# Patient Record
Sex: MALE | Race: WHITE | Employment: OTHER | URBAN - METROPOLITAN AREA
[De-identification: names, ages, dates, MRNs, and addresses within clinical notes are randomized per-mention and may not be internally consistent; named-entity substitution may affect disease eponyms.]

---

## 2022-07-08 ENCOUNTER — OFFICE VISIT (OUTPATIENT)
Dept: SURGERY | Age: 67
End: 2022-07-08
Payer: MEDICARE

## 2022-07-08 VITALS — WEIGHT: 205 LBS | SYSTOLIC BLOOD PRESSURE: 110 MMHG | DIASTOLIC BLOOD PRESSURE: 70 MMHG

## 2022-07-08 DIAGNOSIS — N32.1 COLOVESICAL FISTULA: Primary | ICD-10-CM

## 2022-07-08 PROCEDURE — 99204 OFFICE O/P NEW MOD 45 MIN: CPT | Performed by: SURGERY

## 2022-07-08 ASSESSMENT — ENCOUNTER SYMPTOMS
EYES NEGATIVE: 1
GASTROINTESTINAL NEGATIVE: 1
RESPIRATORY NEGATIVE: 1
ALLERGIC/IMMUNOLOGIC NEGATIVE: 1

## 2022-07-08 NOTE — LETTER
[address]  [address] [address]  Phone: [phone]  Fax: [phone]    July 11, 2022    Patient: Jian Bee  MRN:  [id number]  YOB: 1955  Date of Visit: 7/8/2022    Dear Levy Goltz,    Thank you for the request for consultation for Abril Dhillon. Below are the relevant portions of my assessment and plan of care. Assessment:  Pleasant 73-year-old male who was treated for sigmoid diverticulitis in October 2020. After that episode resolved, the patient had a colonoscopy. He presents now with a 6-week history of pneumaturia. He is currently on antibiotics for urinary tract infection. No complaints of abdominal pain. CAT scan dated 6/30/2022 shows sigmoid diverticulitis with a contained perforation (3.1 cm) and development of a colovesical fistula with subsequent cystitis. The patient's clinical and imaging findings are both consistent with a colovesical fistula from diverticular disease. Plan:  Robotic repair of colovesical fistula with sigmoid colon resection and coloproctostomy. We will coordinate surgery with urology. The patient was explained the risks, benefits and possible complications including risk of bleeding, bowel injury or anastomotic leakage. If you have questions, please do not hesitate to call me. I look forward to following Armida Hart along with you.     Sincerely,    Wojciech Lizarraga MD     providers:    CLEMENTINA Wilson - [facility] [phone]  Via Fax: [phone]

## 2022-07-08 NOTE — PROGRESS NOTES
Barbie Villatoro is a 74 y.o. male     CC: Pneumaturia    HPI: Pleasant 38-year-old male who was treated for sigmoid diverticulitis in 2020. After that episode resolved, the patient had a colonoscopy. He presents now with a 6-week history of pneumaturia. He is currently on antibiotics for urinary tract infection. No complaints of abdominal pain. No nausea, vomiting, anorexia, unexpected weight loss, fevers, chills, change in bowel habits or urinary symptoms. CAT scan dated 2022 shows sigmoid diverticulitis with a contained perforation (3.1 cm) and development of a colovesical fistula with subsequent cystitis. No past medical history on file. Patient has no known allergies. No past surgical history on file. Prior to Visit Medications    Not on File       Social History     Socioeconomic History    Marital status: Unknown     Spouse name: Not on file    Number of children: Not on file    Years of education: Not on file    Highest education level: Not on file   Occupational History    Not on file   Tobacco Use    Smoking status: Former Smoker     Quit date: 2022     Years since quittin.1    Smokeless tobacco: Never Used   Substance and Sexual Activity    Alcohol use: Yes     Comment: occasionally - Quit 2022    Drug use: Never    Sexual activity: Not on file   Other Topics Concern    Not on file   Social History Narrative    Not on file     Social Determinants of Health     Financial Resource Strain:     Difficulty of Paying Living Expenses: Not on file   Food Insecurity:     Worried About [address] in the Last Year: Not on file    [address] in the Last Year: Not on file   Transportation Needs:     Lack of Transportation (Medical): Not on file    Lack of Transportation (Non-Medical):  Not on file   Physical Activity:     Days of Exercise per Week: Not on file    Minutes of Exercise per Session: Not on file   Stress:     Feeling of Stress : Not on file   Social Connections:     Frequency of Communication with Friends and Family: Not on file    Frequency of Social Gatherings with Friends and Family: Not on file    Attends Protestant Services: Not on file    Active Member of Clubs or Organizations: Not on file    Attends Club or Organization Meetings: Not on file    Marital Status: Not on file   Intimate Partner Violence:     Fear of Current or Ex-Partner: Not on file    Emotionally Abused: Not on file    Physically Abused: Not on file    Sexually Abused: Not on file   Housing Stability:     Unable to Pay for Housing in the Last Year: Not on file    Number of [place] in the Last Year: Not on file    Unstable Housing in the Last Year: Not on file       Review of Systems   Constitutional: Negative. HENT: Negative. Eyes: Negative. Respiratory: Negative. Cardiovascular: Negative. Gastrointestinal: Negative. Endocrine: Negative. Genitourinary: Positive for decreased urine volume, difficulty urinating, frequency, hematuria, scrotal swelling and testicular pain. Musculoskeletal: Negative. Skin: Negative. Allergic/Immunologic: Negative. Neurological: Negative. Hematological: Negative. Psychiatric/Behavioral: Negative.        :   Physical Exam  Constitutional:       Appearance: He is well-developed. HENT:      Head: Normocephalic and atraumatic. Right Ear: External ear normal.      Left Ear: External ear normal.   Eyes:      Conjunctiva/sclera: Conjunctivae normal.   Cardiovascular:      Rate and Rhythm: Normal rate and regular rhythm. Pulmonary:      Effort: Pulmonary effort is normal.      Breath sounds: Normal breath sounds. Abdominal:      General: There is no distension. Palpations: Abdomen is soft. Tenderness: There is no abdominal tenderness. Musculoskeletal:         General: Normal range of motion. Cervical back: Normal range of motion and neck supple.    Skin:

## 2022-07-27 NOTE — PROGRESS NOTES
Name_______________________________________Printed:____________________  Date and time of surgery__8/16 1230______________________Arrival Time:__1100______________   1. The instructions given regarding when and if a patient needs to stop oral intake prior to surgery varies. Follow the specific instructions you were given                  __x_Nothing to eat or to drink after Midnight the night before.                   ____Carbo loading or ERAS instructions will be given to select patients-if you have been given those instructions -please do the following                           The evening before your surgery after dinner before midnight drink 40 ounces of gatorade. If you are diabetic use sugar free. The morning of surgery drink 40 ounces of water. This needs to be finished 3 hours prior to your surgery start time. 2. Take the following pills with a small sip of water on the morning of surgery___________________________________________________                  Do not take blood pressure medications ending in pril or sartan the mansoor prior to surgery or the morning of surgery_   3. Aspirin, Ibuprofen, Advil, Naproxen, Vitamin E and other Anti-inflammatory products and supplements should be stopped for 5 -7days before surgery or as directed by your physician. 4. Check with your Doctor regarding stopping Plavix, Coumadin,Eliquis, Lovenox,Effient,Pradaxa,Xarelto, Fragmin or other blood thinners and follow their instructions. 5. Do not smoke, and do not drink any alcoholic beverages 24 hours prior to surgery. This includes NA Beer. Refrain from the usage of any recreational drugs. 6. You may brush your teeth and gargle the morning of surgery. DO NOT SWALLOW WATER   7. You MUST make arrangements for a responsible adult to stay on site while you are here and take you home after your surgery. You will not be allowed to leave alone or drive yourself home.   It is strongly suggested someone stay with you the first 24 hrs. Your surgery will be cancelled if you do not have a ride home. 8. A parent/legal guardian must accompany a child scheduled for surgery and plan to stay at the hospital until the child is discharged. Please do not bring other children with you. 9. Please wear simple, loose fitting clothing to the hospital.  Kristine Jones not bring valuables (money, credit cards, checkbooks, etc.) Do not wear any makeup (including no eye makeup) or nail polish on your fingers or toes. 10. DO NOT wear any jewelry or piercings on day of surgery. All body piercing jewelry must be removed. 11. If you have ___dentures, they will be removed before going to the OR; we will provide you a container. If you wear ___contact lenses or ___glasses, they will be removed; please bring a case for them. 12. Please see your family doctor/pediatrician for a history & physical and/or concerning medications. Bring any test results/reports from your physician's office. PCP__________________Phone___________H&P Appt. Date________             13 If you  have a Living Will and Durable Power of  for Healthcare, please bring in a copy. 15. Notify your Surgeon if you develop any illness between now and surgery  time, cough, cold, fever, sore throat, nausea, vomiting, etc.  Please notify your surgeon if you experience dizziness, shortness of breath or blurred vision between now & the time of your surgery             15. DO NOT shave your operative site 96 hours prior to surgery. For face & neck surgery, men may use an electric razor 48 hours prior to surgery. 16. Shower the night before or morning of surgery using an antibacterial soap or as you have been instructed. 17. To provide excellent care visitors will be limited to one in the room at any given time. 18.  Please bring picture ID and insurance card.              19.  Visit our web site for additional information: [email or web address]              20.During flu season no children under the age of 15 are permitted in the hospital for the safety of all patients. 21. If you take a long acting insulin in the evening only  take half of your usual  dose the night  before your procedure              22. If you use a c-pap please bring DOS if staying overnight,             23.For your convenience [id number] [place] [address] has a pharmacy on site to fill your prescriptions. 24. If you use oxygen and have a portable tank please bring it  with you the DOS             25. Bring a complete list of all your medications with name and dose include any supplements. 26. Other__________________________________________   *Please call pre admission testing if you any further questions   [address] [place] [address]. [facility]  [phone]   [address] [place] [address]       VISITOR POLICY(subject to change)    Current policy is 2 visitors per patient. No children. A mask is required. Visiting hours are 8a-8p. Overnight visitors will be at the discretion of the nurse. All above information reviewed with patient in person or by phone. Patient verbalizes understanding. All questions and concerns addressed.                                                                                                  Patient/Rep____________________                                                                                                                           Per phone/pt         PRE OP INSTRUCTIONS

## 2022-08-16 ENCOUNTER — ANESTHESIA (OUTPATIENT)
Dept: OPERATING ROOM | Age: 67
DRG: 330 | End: 2022-08-16
Payer: MEDICARE

## 2022-08-16 ENCOUNTER — HOSPITAL ENCOUNTER (INPATIENT)
Age: 67
LOS: 9 days | Discharge: HOME | DRG: 330 | End: 2022-08-25
Attending: SURGERY | Admitting: SURGERY
Payer: MEDICARE

## 2022-08-16 ENCOUNTER — ANESTHESIA EVENT (OUTPATIENT)
Dept: OPERATING ROOM | Age: 67
DRG: 330 | End: 2022-08-16
Payer: MEDICARE

## 2022-08-16 DIAGNOSIS — N32.1 COLOVESICAL FISTULA: Primary | ICD-10-CM

## 2022-08-16 LAB
ANION GAP SERPL CALCULATED.3IONS-SCNC: 12 MMOL/L (ref 3–16)
BUN BLDV-MCNC: 9 MG/DL (ref 7–20)
CALCIUM SERPL-MCNC: 9.9 MG/DL (ref 8.3–10.6)
CHLORIDE BLD-SCNC: 102 MMOL/L (ref 99–110)
CO2: 26 MMOL/L (ref 21–32)
CREAT SERPL-MCNC: 0.9 MG/DL (ref 0.8–1.3)
GFR AFRICAN AMERICAN: >60
GFR NON-AFRICAN AMERICAN: >60
GLUCOSE BLD-MCNC: 130 MG/DL (ref 70–99)
HCT VFR BLD CALC: 46.1 % (ref 40.5–52.5)
HEMOGLOBIN: 15.3 G/DL (ref 13.5–17.5)
MCH RBC QN AUTO: 28.5 PG (ref 26–34)
MCHC RBC AUTO-ENTMCNC: 33.2 G/DL (ref 31–36)
MCV RBC AUTO: 86 FL (ref 80–100)
PDW BLD-RTO: 13.7 % (ref 12.4–15.4)
PLATELET # BLD: 335 K/UL (ref 135–450)
PMV BLD AUTO: 6.6 FL (ref 5–10.5)
POTASSIUM SERPL-SCNC: 4.3 MMOL/L (ref 3.5–5.1)
RBC # BLD: 5.36 M/UL (ref 4.2–5.9)
SODIUM BLD-SCNC: 140 MMOL/L (ref 136–145)
WBC # BLD: 10.6 K/UL (ref 4–11)

## 2022-08-16 PROCEDURE — 36415 COLL VENOUS BLD VENIPUNCTURE: CPT

## 2022-08-16 PROCEDURE — 2720000010 HC SURG SUPPLY STERILE: Performed by: SURGERY

## 2022-08-16 PROCEDURE — 88304 TISSUE EXAM BY PATHOLOGIST: CPT

## 2022-08-16 PROCEDURE — 44207 L COLECTOMY/COLOPROCTOSTOMY: CPT | Performed by: SURGERY

## 2022-08-16 PROCEDURE — 2580000003 HC RX 258: Performed by: SURGERY

## 2022-08-16 PROCEDURE — 6360000002 HC RX W HCPCS: Performed by: ANESTHESIOLOGY

## 2022-08-16 PROCEDURE — 88307 TISSUE EXAM BY PATHOLOGIST: CPT

## 2022-08-16 PROCEDURE — 2580000003 HC RX 258: Performed by: NURSE ANESTHETIST, CERTIFIED REGISTERED

## 2022-08-16 PROCEDURE — 7100000000 HC PACU RECOVERY - FIRST 15 MIN: Performed by: SURGERY

## 2022-08-16 PROCEDURE — 6370000000 HC RX 637 (ALT 250 FOR IP): Performed by: NURSE ANESTHETIST, CERTIFIED REGISTERED

## 2022-08-16 PROCEDURE — 94760 N-INVAS EAR/PLS OXIMETRY 1: CPT

## 2022-08-16 PROCEDURE — 88302 TISSUE EXAM BY PATHOLOGIST: CPT

## 2022-08-16 PROCEDURE — 85027 COMPLETE CBC AUTOMATED: CPT

## 2022-08-16 PROCEDURE — 6360000002 HC RX W HCPCS

## 2022-08-16 PROCEDURE — 2500000003 HC RX 250 WO HCPCS: Performed by: NURSE ANESTHETIST, CERTIFIED REGISTERED

## 2022-08-16 PROCEDURE — 6360000002 HC RX W HCPCS: Performed by: NURSE ANESTHETIST, CERTIFIED REGISTERED

## 2022-08-16 PROCEDURE — 2500000003 HC RX 250 WO HCPCS: Performed by: SURGERY

## 2022-08-16 PROCEDURE — 2709999900 HC NON-CHARGEABLE SUPPLY: Performed by: SURGERY

## 2022-08-16 PROCEDURE — 3600000009 HC SURGERY ROBOT BASE: Performed by: SURGERY

## 2022-08-16 PROCEDURE — 1200000000 HC SEMI PRIVATE

## 2022-08-16 PROCEDURE — A4217 STERILE WATER/SALINE, 500 ML: HCPCS | Performed by: SURGERY

## 2022-08-16 PROCEDURE — 2700000000 HC OXYGEN THERAPY PER DAY

## 2022-08-16 PROCEDURE — 3700000001 HC ADD 15 MINUTES (ANESTHESIA): Performed by: SURGERY

## 2022-08-16 PROCEDURE — P9045 ALBUMIN (HUMAN), 5%, 250 ML: HCPCS | Performed by: NURSE ANESTHETIST, CERTIFIED REGISTERED

## 2022-08-16 PROCEDURE — 2580000003 HC RX 258: Performed by: ANESTHESIOLOGY

## 2022-08-16 PROCEDURE — 3600000019 HC SURGERY ROBOT ADDTL 15MIN: Performed by: SURGERY

## 2022-08-16 PROCEDURE — S2900 ROBOTIC SURGICAL SYSTEM: HCPCS | Performed by: SURGERY

## 2022-08-16 PROCEDURE — 3700000000 HC ANESTHESIA ATTENDED CARE: Performed by: SURGERY

## 2022-08-16 PROCEDURE — 7100000001 HC PACU RECOVERY - ADDTL 15 MIN: Performed by: SURGERY

## 2022-08-16 PROCEDURE — 94150 VITAL CAPACITY TEST: CPT

## 2022-08-16 PROCEDURE — 80048 BASIC METABOLIC PNL TOTAL CA: CPT

## 2022-08-16 RX ORDER — SUCCINYLCHOLINE/SOD CL,ISO/PF 200MG/10ML
SYRINGE (ML) INTRAVENOUS PRN
Status: DISCONTINUED | OUTPATIENT
Start: 2022-08-16 | End: 2022-08-16 | Stop reason: SDUPTHER

## 2022-08-16 RX ORDER — HYDROMORPHONE HYDROCHLORIDE 1 MG/ML
1 INJECTION, SOLUTION INTRAMUSCULAR; INTRAVENOUS; SUBCUTANEOUS
Status: DISCONTINUED | OUTPATIENT
Start: 2022-08-16 | End: 2022-08-22

## 2022-08-16 RX ORDER — CARBOXYMETHYLCELLULOSE SODIUM 10 MG/ML
GEL OPHTHALMIC PRN
Status: DISCONTINUED | OUTPATIENT
Start: 2022-08-16 | End: 2022-08-16 | Stop reason: SDUPTHER

## 2022-08-16 RX ORDER — ONDANSETRON 2 MG/ML
4 INJECTION INTRAMUSCULAR; INTRAVENOUS EVERY 6 HOURS PRN
Status: DISCONTINUED | OUTPATIENT
Start: 2022-08-16 | End: 2022-08-25 | Stop reason: HOSPADM

## 2022-08-16 RX ORDER — MEPERIDINE HYDROCHLORIDE 25 MG/ML
12.5 INJECTION INTRAMUSCULAR; INTRAVENOUS; SUBCUTANEOUS EVERY 5 MIN PRN
Status: DISCONTINUED | OUTPATIENT
Start: 2022-08-16 | End: 2022-08-16 | Stop reason: HOSPADM

## 2022-08-16 RX ORDER — KETAMINE HCL IN NACL, ISO-OSM 100MG/10ML
SYRINGE (ML) INJECTION PRN
Status: DISCONTINUED | OUTPATIENT
Start: 2022-08-16 | End: 2022-08-16 | Stop reason: SDUPTHER

## 2022-08-16 RX ORDER — BUPIVACAINE HYDROCHLORIDE AND EPINEPHRINE 5; 5 MG/ML; UG/ML
INJECTION, SOLUTION EPIDURAL; INTRACAUDAL; PERINEURAL
Status: COMPLETED | OUTPATIENT
Start: 2022-08-16 | End: 2022-08-16

## 2022-08-16 RX ORDER — METRONIDAZOLE 500 MG/100ML
INJECTION, SOLUTION INTRAVENOUS
Status: DISCONTINUED
Start: 2022-08-16 | End: 2022-08-16

## 2022-08-16 RX ORDER — GLYCOPYRROLATE 0.2 MG/ML
INJECTION INTRAMUSCULAR; INTRAVENOUS PRN
Status: DISCONTINUED | OUTPATIENT
Start: 2022-08-16 | End: 2022-08-16 | Stop reason: SDUPTHER

## 2022-08-16 RX ORDER — HYDROMORPHONE HCL 110MG/55ML
PATIENT CONTROLLED ANALGESIA SYRINGE INTRAVENOUS PRN
Status: DISCONTINUED | OUTPATIENT
Start: 2022-08-16 | End: 2022-08-16 | Stop reason: SDUPTHER

## 2022-08-16 RX ORDER — SODIUM CHLORIDE, SODIUM LACTATE, POTASSIUM CHLORIDE, CALCIUM CHLORIDE 600; 310; 30; 20 MG/100ML; MG/100ML; MG/100ML; MG/100ML
INJECTION, SOLUTION INTRAVENOUS CONTINUOUS
Status: DISCONTINUED | OUTPATIENT
Start: 2022-08-16 | End: 2022-08-16 | Stop reason: HOSPADM

## 2022-08-16 RX ORDER — LIDOCAINE HYDROCHLORIDE 20 MG/ML
INJECTION, SOLUTION EPIDURAL; INFILTRATION; INTRACAUDAL; PERINEURAL PRN
Status: DISCONTINUED | OUTPATIENT
Start: 2022-08-16 | End: 2022-08-16 | Stop reason: SDUPTHER

## 2022-08-16 RX ORDER — METRONIDAZOLE 500 MG/100ML
500 INJECTION, SOLUTION INTRAVENOUS ONCE
Status: DISCONTINUED | OUTPATIENT
Start: 2022-08-16 | End: 2022-08-16

## 2022-08-16 RX ORDER — SODIUM CHLORIDE 0.9 % (FLUSH) 0.9 %
5-40 SYRINGE (ML) INJECTION PRN
Status: DISCONTINUED | OUTPATIENT
Start: 2022-08-16 | End: 2022-08-25 | Stop reason: HOSPADM

## 2022-08-16 RX ORDER — DEXAMETHASONE SODIUM PHOSPHATE 4 MG/ML
INJECTION, SOLUTION INTRA-ARTICULAR; INTRALESIONAL; INTRAMUSCULAR; INTRAVENOUS; SOFT TISSUE PRN
Status: DISCONTINUED | OUTPATIENT
Start: 2022-08-16 | End: 2022-08-16 | Stop reason: SDUPTHER

## 2022-08-16 RX ORDER — SODIUM CHLORIDE 0.9 % (FLUSH) 0.9 %
5-40 SYRINGE (ML) INJECTION PRN
Status: DISCONTINUED | OUTPATIENT
Start: 2022-08-16 | End: 2022-08-16 | Stop reason: HOSPADM

## 2022-08-16 RX ORDER — SODIUM CHLORIDE 9 MG/ML
INJECTION, SOLUTION INTRAVENOUS CONTINUOUS PRN
Status: DISCONTINUED | OUTPATIENT
Start: 2022-08-16 | End: 2022-08-16 | Stop reason: SDUPTHER

## 2022-08-16 RX ORDER — CEFAZOLIN 2 G/1
INJECTION, POWDER, FOR SOLUTION INTRAMUSCULAR; INTRAVENOUS
Status: COMPLETED
Start: 2022-08-16 | End: 2022-08-16

## 2022-08-16 RX ORDER — MAGNESIUM SULFATE HEPTAHYDRATE 500 MG/ML
INJECTION, SOLUTION INTRAMUSCULAR; INTRAVENOUS PRN
Status: DISCONTINUED | OUTPATIENT
Start: 2022-08-16 | End: 2022-08-16 | Stop reason: SDUPTHER

## 2022-08-16 RX ORDER — TAMSULOSIN HYDROCHLORIDE 0.4 MG/1
0.4 CAPSULE ORAL DAILY
Status: DISCONTINUED | OUTPATIENT
Start: 2022-08-16 | End: 2022-08-25 | Stop reason: HOSPADM

## 2022-08-16 RX ORDER — DEXTROSE, SODIUM CHLORIDE, AND POTASSIUM CHLORIDE 5; .45; .15 G/100ML; G/100ML; G/100ML
INJECTION INTRAVENOUS CONTINUOUS
Status: DISCONTINUED | OUTPATIENT
Start: 2022-08-16 | End: 2022-08-25 | Stop reason: HOSPADM

## 2022-08-16 RX ORDER — PROPOFOL 10 MG/ML
INJECTION, EMULSION INTRAVENOUS PRN
Status: DISCONTINUED | OUTPATIENT
Start: 2022-08-16 | End: 2022-08-16 | Stop reason: SDUPTHER

## 2022-08-16 RX ORDER — VECURONIUM BROMIDE 1 MG/ML
INJECTION, POWDER, LYOPHILIZED, FOR SOLUTION INTRAVENOUS PRN
Status: DISCONTINUED | OUTPATIENT
Start: 2022-08-16 | End: 2022-08-16 | Stop reason: SDUPTHER

## 2022-08-16 RX ORDER — SODIUM CHLORIDE, SODIUM LACTATE, POTASSIUM CHLORIDE, CALCIUM CHLORIDE 600; 310; 30; 20 MG/100ML; MG/100ML; MG/100ML; MG/100ML
INJECTION, SOLUTION INTRAVENOUS CONTINUOUS PRN
Status: DISCONTINUED | OUTPATIENT
Start: 2022-08-16 | End: 2022-08-16 | Stop reason: SDUPTHER

## 2022-08-16 RX ORDER — FENTANYL CITRATE 50 UG/ML
INJECTION, SOLUTION INTRAMUSCULAR; INTRAVENOUS PRN
Status: DISCONTINUED | OUTPATIENT
Start: 2022-08-16 | End: 2022-08-16 | Stop reason: SDUPTHER

## 2022-08-16 RX ORDER — ALBUMIN, HUMAN INJ 5% 5 %
SOLUTION INTRAVENOUS PRN
Status: DISCONTINUED | OUTPATIENT
Start: 2022-08-16 | End: 2022-08-16 | Stop reason: SDUPTHER

## 2022-08-16 RX ORDER — MAGNESIUM HYDROXIDE 1200 MG/15ML
LIQUID ORAL CONTINUOUS PRN
Status: COMPLETED | OUTPATIENT
Start: 2022-08-16 | End: 2022-08-16

## 2022-08-16 RX ORDER — EPHEDRINE SULFATE/0.9% NACL/PF 50 MG/5 ML
SYRINGE (ML) INTRAVENOUS PRN
Status: DISCONTINUED | OUTPATIENT
Start: 2022-08-16 | End: 2022-08-16 | Stop reason: SDUPTHER

## 2022-08-16 RX ORDER — SODIUM CHLORIDE 9 MG/ML
INJECTION, SOLUTION INTRAVENOUS PRN
Status: DISCONTINUED | OUTPATIENT
Start: 2022-08-16 | End: 2022-08-25 | Stop reason: HOSPADM

## 2022-08-16 RX ORDER — SODIUM CHLORIDE 9 MG/ML
INJECTION, SOLUTION INTRAVENOUS PRN
Status: DISCONTINUED | OUTPATIENT
Start: 2022-08-16 | End: 2022-08-16 | Stop reason: HOSPADM

## 2022-08-16 RX ORDER — HYDROMORPHONE HCL 110MG/55ML
0.5 PATIENT CONTROLLED ANALGESIA SYRINGE INTRAVENOUS EVERY 5 MIN PRN
Status: DISCONTINUED | OUTPATIENT
Start: 2022-08-16 | End: 2022-08-16 | Stop reason: HOSPADM

## 2022-08-16 RX ORDER — MIDAZOLAM HYDROCHLORIDE 1 MG/ML
INJECTION INTRAMUSCULAR; INTRAVENOUS PRN
Status: DISCONTINUED | OUTPATIENT
Start: 2022-08-16 | End: 2022-08-16 | Stop reason: SDUPTHER

## 2022-08-16 RX ORDER — SODIUM CHLORIDE 0.9 % (FLUSH) 0.9 %
5-40 SYRINGE (ML) INJECTION EVERY 12 HOURS SCHEDULED
Status: DISCONTINUED | OUTPATIENT
Start: 2022-08-16 | End: 2022-08-16 | Stop reason: HOSPADM

## 2022-08-16 RX ORDER — ONDANSETRON 2 MG/ML
4 INJECTION INTRAMUSCULAR; INTRAVENOUS
Status: COMPLETED | OUTPATIENT
Start: 2022-08-16 | End: 2022-08-16

## 2022-08-16 RX ORDER — SODIUM CHLORIDE 0.9 % (FLUSH) 0.9 %
5-40 SYRINGE (ML) INJECTION EVERY 12 HOURS SCHEDULED
Status: DISCONTINUED | OUTPATIENT
Start: 2022-08-16 | End: 2022-08-25 | Stop reason: HOSPADM

## 2022-08-16 RX ORDER — ENOXAPARIN SODIUM 100 MG/ML
40 INJECTION SUBCUTANEOUS DAILY
Status: DISCONTINUED | OUTPATIENT
Start: 2022-08-17 | End: 2022-08-25 | Stop reason: HOSPADM

## 2022-08-16 RX ORDER — SODIUM CHLORIDE, SODIUM LACTATE, POTASSIUM CHLORIDE, CALCIUM CHLORIDE 600; 310; 30; 20 MG/100ML; MG/100ML; MG/100ML; MG/100ML
INJECTION, SOLUTION INTRAVENOUS CONTINUOUS PRN
Status: COMPLETED | OUTPATIENT
Start: 2022-08-16 | End: 2022-08-16

## 2022-08-16 RX ORDER — ONDANSETRON 2 MG/ML
INJECTION INTRAMUSCULAR; INTRAVENOUS PRN
Status: DISCONTINUED | OUTPATIENT
Start: 2022-08-16 | End: 2022-08-16 | Stop reason: SDUPTHER

## 2022-08-16 RX ADMIN — VECURONIUM BROMIDE 3 MG: 1 INJECTION, POWDER, LYOPHILIZED, FOR SOLUTION INTRAVENOUS at 13:36

## 2022-08-16 RX ADMIN — HYDROMORPHONE HYDROCHLORIDE 0.5 MG: 2 INJECTION, SOLUTION INTRAMUSCULAR; INTRAVENOUS; SUBCUTANEOUS at 16:15

## 2022-08-16 RX ADMIN — SODIUM CHLORIDE, POTASSIUM CHLORIDE, SODIUM LACTATE AND CALCIUM CHLORIDE: 600; 310; 30; 20 INJECTION, SOLUTION INTRAVENOUS at 12:26

## 2022-08-16 RX ADMIN — POTASSIUM CHLORIDE, DEXTROSE MONOHYDRATE AND SODIUM CHLORIDE: 150; 5; 450 INJECTION, SOLUTION INTRAVENOUS at 17:58

## 2022-08-16 RX ADMIN — PHENYLEPHRINE HYDROCHLORIDE 100 MCG: 10 INJECTION INTRAVENOUS at 14:43

## 2022-08-16 RX ADMIN — SUGAMMADEX 200 MG: 100 INJECTION, SOLUTION INTRAVENOUS at 16:10

## 2022-08-16 RX ADMIN — SODIUM CHLORIDE, POTASSIUM CHLORIDE, SODIUM LACTATE AND CALCIUM CHLORIDE: 600; 310; 30; 20 INJECTION, SOLUTION INTRAVENOUS at 15:07

## 2022-08-16 RX ADMIN — SODIUM CHLORIDE, POTASSIUM CHLORIDE, SODIUM LACTATE AND CALCIUM CHLORIDE: 600; 310; 30; 20 INJECTION, SOLUTION INTRAVENOUS at 11:47

## 2022-08-16 RX ADMIN — SODIUM CHLORIDE: 9 INJECTION, SOLUTION INTRAVENOUS at 12:36

## 2022-08-16 RX ADMIN — PHENYLEPHRINE HYDROCHLORIDE 200 MCG: 10 INJECTION INTRAVENOUS at 12:38

## 2022-08-16 RX ADMIN — CARBOXYMETHYLCELLULOSE SODIUM 2 DROP: 10 GEL OPHTHALMIC at 12:35

## 2022-08-16 RX ADMIN — DEXAMETHASONE SODIUM PHOSPHATE 10 MG: 4 INJECTION, SOLUTION INTRAMUSCULAR; INTRAVENOUS at 12:45

## 2022-08-16 RX ADMIN — HYDROMORPHONE HYDROCHLORIDE 0.5 MG: 2 INJECTION, SOLUTION INTRAMUSCULAR; INTRAVENOUS; SUBCUTANEOUS at 17:54

## 2022-08-16 RX ADMIN — PHENYLEPHRINE HYDROCHLORIDE 100 MCG: 10 INJECTION INTRAVENOUS at 12:47

## 2022-08-16 RX ADMIN — VECURONIUM BROMIDE 2 MG: 1 INJECTION, POWDER, LYOPHILIZED, FOR SOLUTION INTRAVENOUS at 14:19

## 2022-08-16 RX ADMIN — LIDOCAINE HYDROCHLORIDE 100 MG: 20 INJECTION, SOLUTION EPIDURAL; INFILTRATION; INTRACAUDAL; PERINEURAL at 12:33

## 2022-08-16 RX ADMIN — HYDROMORPHONE HYDROCHLORIDE 0.5 MG: 2 INJECTION, SOLUTION INTRAMUSCULAR; INTRAVENOUS; SUBCUTANEOUS at 18:02

## 2022-08-16 RX ADMIN — PHENYLEPHRINE HYDROCHLORIDE 200 MCG: 10 INJECTION INTRAVENOUS at 12:53

## 2022-08-16 RX ADMIN — ONDANSETRON 4 MG: 2 INJECTION INTRAMUSCULAR; INTRAVENOUS at 17:52

## 2022-08-16 RX ADMIN — VECURONIUM BROMIDE 1 MG: 1 INJECTION, POWDER, LYOPHILIZED, FOR SOLUTION INTRAVENOUS at 14:52

## 2022-08-16 RX ADMIN — ALBUMIN (HUMAN) 12.5 G: 12.5 INJECTION, SOLUTION INTRAVENOUS at 13:13

## 2022-08-16 RX ADMIN — MIDAZOLAM 2 MG: 1 INJECTION INTRAMUSCULAR; INTRAVENOUS at 12:26

## 2022-08-16 RX ADMIN — PHENYLEPHRINE HYDROCHLORIDE 200 MCG: 10 INJECTION INTRAVENOUS at 12:40

## 2022-08-16 RX ADMIN — PHENYLEPHRINE HYDROCHLORIDE 200 MCG: 10 INJECTION INTRAVENOUS at 14:45

## 2022-08-16 RX ADMIN — Medication 10 MG: at 12:57

## 2022-08-16 RX ADMIN — VECURONIUM BROMIDE 1 MG: 1 INJECTION, POWDER, LYOPHILIZED, FOR SOLUTION INTRAVENOUS at 15:44

## 2022-08-16 RX ADMIN — VECURONIUM BROMIDE 3 MG: 1 INJECTION, POWDER, LYOPHILIZED, FOR SOLUTION INTRAVENOUS at 15:10

## 2022-08-16 RX ADMIN — CEFAZOLIN 2000 MG: 2 INJECTION, POWDER, FOR SOLUTION INTRAMUSCULAR; INTRAVENOUS at 12:22

## 2022-08-16 RX ADMIN — FENTANYL CITRATE 50 MCG: 50 INJECTION, SOLUTION INTRAMUSCULAR; INTRAVENOUS at 16:07

## 2022-08-16 RX ADMIN — ONDANSETRON 4 MG: 2 INJECTION INTRAMUSCULAR; INTRAVENOUS at 12:45

## 2022-08-16 RX ADMIN — PROPOFOL 150 MG: 10 INJECTION, EMULSION INTRAVENOUS at 12:33

## 2022-08-16 RX ADMIN — Medication 30 MG: at 12:45

## 2022-08-16 RX ADMIN — SODIUM CHLORIDE, POTASSIUM CHLORIDE, SODIUM LACTATE AND CALCIUM CHLORIDE: 600; 310; 30; 20 INJECTION, SOLUTION INTRAVENOUS at 16:19

## 2022-08-16 RX ADMIN — Medication 10 MG: at 15:11

## 2022-08-16 RX ADMIN — Medication 10 MG: at 15:59

## 2022-08-16 RX ADMIN — VECURONIUM BROMIDE 1 MG: 1 INJECTION, POWDER, LYOPHILIZED, FOR SOLUTION INTRAVENOUS at 12:33

## 2022-08-16 RX ADMIN — Medication 160 MG: at 12:34

## 2022-08-16 RX ADMIN — VECURONIUM BROMIDE 9 MG: 1 INJECTION, POWDER, LYOPHILIZED, FOR SOLUTION INTRAVENOUS at 12:45

## 2022-08-16 RX ADMIN — MAGNESIUM SULFATE HEPTAHYDRATE 1 G: 500 INJECTION, SOLUTION INTRAMUSCULAR; INTRAVENOUS at 12:45

## 2022-08-16 RX ADMIN — Medication 20 MG: at 16:03

## 2022-08-16 RX ADMIN — GLYCOPYRROLATE 0.2 MG: 0.2 INJECTION, SOLUTION INTRAMUSCULAR; INTRAVENOUS at 12:45

## 2022-08-16 RX ADMIN — FENTANYL CITRATE 50 MCG: 50 INJECTION, SOLUTION INTRAMUSCULAR; INTRAVENOUS at 12:33

## 2022-08-16 ASSESSMENT — PAIN DESCRIPTION - LOCATION
LOCATION: ABDOMEN
LOCATION: ABDOMEN

## 2022-08-16 ASSESSMENT — PAIN SCALES - GENERAL
PAINLEVEL_OUTOF10: 0
PAINLEVEL_OUTOF10: 8
PAINLEVEL_OUTOF10: 0
PAINLEVEL_OUTOF10: 7

## 2022-08-16 NOTE — PROGRESS NOTES
Pt arrived from OR to PACU, oral airway in place. VSS, O2 sats 100% on 6 L simple mask. Incisions to abdomen dry and intact, abdomen rounded, soft. Loop ileostomy in place, stoma pink, moist. Estrada in place draining clear su urine. Will monitor.

## 2022-08-16 NOTE — H&P
Vianney Fowler     HPI: 74year old male with a colovesical fistula    History reviewed. No pertinent past medical history. History reviewed. No pertinent surgical history. Social History     Socioeconomic History    Marital status: Single     Spouse name: Not on file    Number of children: Not on file    Years of education: Not on file    Highest education level: Not on file   Occupational History    Not on file   Tobacco Use    Smoking status: Former     Types: Cigarettes     Quit date: 2022     Years since quittin.2    Smokeless tobacco: Never   Vaping Use    Vaping Use: Never used   Substance and Sexual Activity    Alcohol use: Yes     Comment: occasionally - Quit 2022    Drug use: Yes     Frequency: 2.0 times per week     Types: Marijuana Kang Arabella)    Sexual activity: Not on file   Other Topics Concern    Not on file   Social History Narrative    Not on file     Social Determinants of Health     Financial Resource Strain: Not on file   Food Insecurity: Not on file   Transportation Needs: Not on file   Physical Activity: Not on file   Stress: Not on file   Social Connections: Not on file   Intimate Partner Violence: Not on file   Housing Stability: Not on file       Allergies: No Known Allergies    Prior to Admission medications    Medication Sig Start Date End Date Taking? Authorizing Provider   Tamsulosin HCl (FLOMAX PO) Take by mouth   Yes Historical Provider, MD       Active Problems:    * No active hospital problems. *  Resolved Problems:    * No resolved hospital problems. *      Blood pressure 103/64, pulse 85, temperature 96.8 °F (36 °C), temperature source Temporal, resp. rate 18, height 6' (1.829 m), weight 200 lb 3.2 oz (90.8 kg), SpO2 95 %. Review of Systems    Physical Exam  Cardiovascular:      Rate and Rhythm: Normal rate and regular rhythm. Pulmonary:      Effort: Pulmonary effort is normal.      Breath sounds: Normal breath sounds.        Assessment:  Colovesical fistula    Plan:  Sigmoid colon resection with repair of colovesical fistula    Merrill Cat MD  8/16/2022

## 2022-08-16 NOTE — PROGRESS NOTES
Pt resting quietly in bed with eyes closed, awakens to voice. VSS, O2 sats 99% on 3 L NC. Incisions to abdomen dry and intact, abdomen soft, ice pack in place. Stoma pink, moist, small amount of bloody output. Estrada in place draining clear su urine. Pt seen by anesthesia,phase 1 criteria met. Will transfer pt to .

## 2022-08-16 NOTE — BRIEF OP NOTE
Brief Postoperative Note      Patient: Ramona Enrique  YOB: 1955  MRN: [id number]    Date of Procedure: 8/16/2022    Pre-Op Diagnosis: Colovesical fistula [N32.1]    Post-Op Diagnosis: Same       Procedure(s):  ROBOT ASSIST LAPAROSCOPIC SIGMOID COLON RESECTION WITH REPAIR OF COLOVESICAL FISTULA, APPENDECTOMY    Surgeon(s):  Dorie Crain MD    Assistant:  Surgical Assistant: Cayla Dsouza; Franco Rosales RN    Anesthesia: General    Estimated Blood Loss (mL): Minimal    Complications: None    Specimens:   ID Type Source Tests Collected by Time Destination   A : A) APPENDIX Tissue Tissue SURGICAL PATHOLOGY Dorie Crain MD 8/16/2022 1443    B : B) SIGMOID COLON Tissue Tissue SURGICAL PATHOLOGY Dorie Crain MD 8/16/2022 1501    C : C) ADDITIONAL PROXIMAL SEGMENTS AND ANASTOMOTIC RINGS Tissue Tissue SURGICAL PATHOLOGY Dorie Crain MD 8/16/2022 1504        Implants:  * No implants in log *      Drains:   Urinary Catheter Estrada (Active)       Findings: Severe inflammatory changes of the sigmoid colon which was densely adherent to the bladder. Colovesical fistula repaired. Sigmoid colon resection with coloproctostomy performed. Anastomosis diverted with a loop ileostomy.     Electronically signed by Dorie Crain MD on 8/16/2022 at 4:13 PM

## 2022-08-16 NOTE — ANESTHESIA PRE PROCEDURE
Department of Anesthesiology  Preprocedure Note       Name:  Janeann Felty   Age:  74 y.o.  :  1955                                          MRN:  [id number]         Date:  2022      Surgeon: Alyssa Lobato):  Rosie Vargas MD    Procedure: Procedure(s):  ROBOT ASSIST LAPAROSCOPIC SIGMOID COLON RESECTION WITH REPAIR OF COLOVESICAL FISTULA    Medications prior to admission:   Prior to Admission medications    Medication Sig Start Date End Date Taking? Authorizing Provider   Tamsulosin HCl (FLOMAX PO) Take by mouth   Yes Historical Provider, MD       Current medications:    No current facility-administered medications for this encounter. Allergies:  No Known Allergies    Problem List:  There is no problem list on file for this patient. Past Medical History:  History reviewed. No pertinent past medical history. Past Surgical History:  History reviewed. No pertinent surgical history.     Social History:    Social History     Tobacco Use    Smoking status: Former     Types: Cigarettes     Quit date: 2022     Years since quittin.2    Smokeless tobacco: Never   Substance Use Topics    Alcohol use: Yes     Comment: occasionally - Quit 2022                                Counseling given: Not Answered      Vital Signs (Current):   Vitals:    22 1017 22 1117   BP:  103/64   Pulse:  85   Resp:  18   Temp:  96.8 °F (36 °C)   TempSrc:  Temporal   SpO2:  95%   Weight: 200 lb (90.7 kg) 200 lb 3.2 oz (90.8 kg)   Height: 6' (1.829 m) 6' (1.829 m)                                              BP Readings from Last 3 Encounters:   22 103/64   22 110/70       NPO Status: Time of last liquid consumption: 2330                        Time of last solid consumption: 2300                        Date of last liquid consumption: 08/15/22                        Date of last solid food consumption: 22    BMI:   Wt Readings from Last 3 Encounters:   22 200 lb 3.2 oz (90.8 kg)   07/08/22 205 lb (93 kg)     Body mass index is 27.15 kg/m². CBC: No results found for: WBC, RBC, HGB, HCT, MCV, RDW, PLT    CMP: No results found for: NA, K, CL, CO2, BUN, CREATININE, GFRAA, AGRATIO, LABGLOM, GLUCOSE, GLU, PROT, CALCIUM, BILITOT, ALKPHOS, AST, ALT    POC Tests: No results for input(s): POCGLU, POCNA, POCK, POCCL, POCBUN, POCHEMO, POCHCT in the last 72 hours. Coags: No results found for: PROTIME, INR, APTT    HCG (If Applicable): No results found for: PREGTESTUR, PREGSERUM, HCG, HCGQUANT     ABGs: No results found for: PHART, PO2ART, SSZ5NZM, LQN8JWM, BEART, Y1LCQUSA     Type & Screen (If Applicable):  No results found for: LABABO, LABRH    Drug/Infectious Status (If Applicable):  No results found for: HIV, HEPCAB    COVID-19 Screening (If Applicable): No results found for: COVID19        Anesthesia Evaluation  Patient summary reviewed and Nursing notes reviewed no history of anesthetic complications:   Airway: Mallampati: II          Dental:          Pulmonary:                              Cardiovascular:                      Neuro/Psych:               GI/Hepatic/Renal:             Endo/Other:                     Abdominal:             Vascular:           Other Findings:           Anesthesia Plan      general     ASA 2                                   Gaby Dupree MD   8/16/2022

## 2022-08-17 LAB
BASOPHILS ABSOLUTE: 0 K/UL (ref 0–0.2)
BASOPHILS RELATIVE PERCENT: 0.3 %
EOSINOPHILS ABSOLUTE: 0 K/UL (ref 0–0.6)
EOSINOPHILS RELATIVE PERCENT: 0 %
HCT VFR BLD CALC: 36.8 % (ref 40.5–52.5)
HEMOGLOBIN: 12.4 G/DL (ref 13.5–17.5)
LYMPHOCYTES ABSOLUTE: 0.7 K/UL (ref 1–5.1)
LYMPHOCYTES RELATIVE PERCENT: 4.4 %
MCH RBC QN AUTO: 28.9 PG (ref 26–34)
MCHC RBC AUTO-ENTMCNC: 33.7 G/DL (ref 31–36)
MCV RBC AUTO: 85.7 FL (ref 80–100)
MONOCYTES ABSOLUTE: 0.6 K/UL (ref 0–1.3)
MONOCYTES RELATIVE PERCENT: 3.9 %
NEUTROPHILS ABSOLUTE: 15 K/UL (ref 1.7–7.7)
NEUTROPHILS RELATIVE PERCENT: 91.4 %
PDW BLD-RTO: 13.4 % (ref 12.4–15.4)
PLATELET # BLD: 302 K/UL (ref 135–450)
PMV BLD AUTO: 6.9 FL (ref 5–10.5)
RBC # BLD: 4.29 M/UL (ref 4.2–5.9)
WBC # BLD: 16.4 K/UL (ref 4–11)

## 2022-08-17 PROCEDURE — 2500000003 HC RX 250 WO HCPCS: Performed by: SURGERY

## 2022-08-17 PROCEDURE — APPNB30 APP NON BILLABLE TIME 0-30 MINS: Performed by: NURSE PRACTITIONER

## 2022-08-17 PROCEDURE — 85025 COMPLETE CBC W/AUTO DIFF WBC: CPT

## 2022-08-17 PROCEDURE — 36415 COLL VENOUS BLD VENIPUNCTURE: CPT

## 2022-08-17 PROCEDURE — 6370000000 HC RX 637 (ALT 250 FOR IP): Performed by: NURSE PRACTITIONER

## 2022-08-17 PROCEDURE — APPSS15 APP SPLIT SHARED TIME 0-15 MINUTES: Performed by: NURSE PRACTITIONER

## 2022-08-17 PROCEDURE — 6370000000 HC RX 637 (ALT 250 FOR IP): Performed by: SURGERY

## 2022-08-17 PROCEDURE — 6360000002 HC RX W HCPCS: Performed by: SURGERY

## 2022-08-17 PROCEDURE — 99024 POSTOP FOLLOW-UP VISIT: CPT | Performed by: SURGERY

## 2022-08-17 PROCEDURE — 2580000003 HC RX 258: Performed by: SURGERY

## 2022-08-17 PROCEDURE — 1200000000 HC SEMI PRIVATE

## 2022-08-17 RX ORDER — CALCIUM CARBONATE 200(500)MG
500 TABLET,CHEWABLE ORAL 3 TIMES DAILY PRN
Status: DISCONTINUED | OUTPATIENT
Start: 2022-08-17 | End: 2022-08-25 | Stop reason: HOSPADM

## 2022-08-17 RX ADMIN — HYDROMORPHONE HYDROCHLORIDE 1 MG: 1 INJECTION, SOLUTION INTRAMUSCULAR; INTRAVENOUS; SUBCUTANEOUS at 00:45

## 2022-08-17 RX ADMIN — POTASSIUM CHLORIDE, DEXTROSE MONOHYDRATE AND SODIUM CHLORIDE: 150; 5; 450 INJECTION, SOLUTION INTRAVENOUS at 02:48

## 2022-08-17 RX ADMIN — HYDROMORPHONE HYDROCHLORIDE 1 MG: 1 INJECTION, SOLUTION INTRAMUSCULAR; INTRAVENOUS; SUBCUTANEOUS at 06:00

## 2022-08-17 RX ADMIN — ANTACID TABLETS 500 MG: 500 TABLET, CHEWABLE ORAL at 19:39

## 2022-08-17 RX ADMIN — POTASSIUM CHLORIDE, DEXTROSE MONOHYDRATE AND SODIUM CHLORIDE: 150; 5; 450 INJECTION, SOLUTION INTRAVENOUS at 13:13

## 2022-08-17 RX ADMIN — HYDROMORPHONE HYDROCHLORIDE 1 MG: 1 INJECTION, SOLUTION INTRAMUSCULAR; INTRAVENOUS; SUBCUTANEOUS at 19:39

## 2022-08-17 RX ADMIN — ANTACID TABLETS 500 MG: 500 TABLET, CHEWABLE ORAL at 11:21

## 2022-08-17 RX ADMIN — TAMSULOSIN HYDROCHLORIDE 0.4 MG: 0.4 CAPSULE ORAL at 08:37

## 2022-08-17 RX ADMIN — HYDROMORPHONE HYDROCHLORIDE 1 MG: 1 INJECTION, SOLUTION INTRAMUSCULAR; INTRAVENOUS; SUBCUTANEOUS at 21:55

## 2022-08-17 RX ADMIN — Medication 10 ML: at 08:45

## 2022-08-17 RX ADMIN — ENOXAPARIN SODIUM 40 MG: 100 INJECTION SUBCUTANEOUS at 08:38

## 2022-08-17 RX ADMIN — HYDROMORPHONE HYDROCHLORIDE 1 MG: 1 INJECTION, SOLUTION INTRAMUSCULAR; INTRAVENOUS; SUBCUTANEOUS at 10:40

## 2022-08-17 RX ADMIN — POTASSIUM CHLORIDE, DEXTROSE MONOHYDRATE AND SODIUM CHLORIDE: 150; 5; 450 INJECTION, SOLUTION INTRAVENOUS at 21:57

## 2022-08-17 RX ADMIN — HYDROMORPHONE HYDROCHLORIDE 1 MG: 1 INJECTION, SOLUTION INTRAMUSCULAR; INTRAVENOUS; SUBCUTANEOUS at 15:28

## 2022-08-17 RX ADMIN — ANTACID TABLETS 500 MG: 500 TABLET, CHEWABLE ORAL at 15:28

## 2022-08-17 ASSESSMENT — PAIN SCALES - GENERAL
PAINLEVEL_OUTOF10: 7
PAINLEVEL_OUTOF10: 6
PAINLEVEL_OUTOF10: 7
PAINLEVEL_OUTOF10: 8
PAINLEVEL_OUTOF10: 9

## 2022-08-17 ASSESSMENT — PAIN DESCRIPTION - LOCATION
LOCATION: ABDOMEN
LOCATION: ABDOMEN

## 2022-08-17 NOTE — PROGRESS NOTES
[facility] and Laparoscopic Surgery        Progress Note    Patient Name: Gordo Gonzáles  MRN: [id number]  YOB: 1955  Date of Evaluation: 2022    Subjective:  No acute events overnight  Pain controlled  No nausea or vomiting, tolerated coffee this morning, but complains of dyspepsia early this morning, a little better at present, poor appetite  No enteric output from ostomy, scant bloody output  Resting in bed at this time    Post-Operative Day #1      Vital Signs:  Patient Vitals for the past 24 hrs:   BP Temp Temp src Pulse Resp SpO2   22 0830 107/62 98 °F (36.7 °C) Oral 73 15 91 %   22 0600 100/64 98.7 °F (37.1 °C) Oral 77 16 97 %   22 0045 -- -- -- -- 18 --   22 2300 124/70 98 °F (36.7 °C) Oral 70 18 98 %   22 -- -- -- 76 16 99 %   22 2030 129/73 98 °F (36.7 °C) Oral 74 16 99 %   22 1830 121/79 97.9 °F (36.6 °C) Oral 73 16 100 %   22 1805 118/63 -- -- 70 14 99 %   22 1800 117/67 -- -- 67 15 98 %   22 1745 115/63 -- -- 70 16 99 %   22 1730 (!) 119/59 -- -- 72 17 99 %   22 1715 (!) 114/58 -- -- 72 18 98 %   22 1700 (!) 102/57 -- -- 73 17 96 %   22 1645 94/63 -- -- 72 16 98 %   22 1635 (!) 114/52 97 °F (36.1 °C) Temporal 74 15 99 %   22 1630 (!) 107/54 -- -- 75 15 98 %   22 1625 (!) 109/56 -- -- 73 15 100 %   22 1620 (!) 105/54 -- -- 78 14 97 %   22 1618 (!) 105/54 96.8 °F (36 °C) Temporal 79 14 100 %      TEMPERATURE HISTORY 24H: Temp (24hrs), Av.8 °F (36.6 °C), Min:96.8 °F (36 °C), Max:98.7 °F (37.1 °C)    BLOOD PRESSURE HISTORY: Systolic (39NJA), UVT:077 , Min:94 , QKB:277    Diastolic (30IJN), FYZ:06, Min:52, Max:79      Intake/Output:  I/O last 3 completed shifts: In: 5871 [I.V.:3700; IV Piggyback:250]  Out: 1000 [Urine:900; Blood:100]  No intake/output data recorded.   Drain/tube Output:       Physical Exam:  General: awake, alert, oriented to person, place, time  Lungs: unlabored respirations  Abdomen: soft, distended, incisional tenderness only, bowel sounds present, stoma pink/viable--only small amount of bloody output in pouch  Skin/Wound: healing well, no drainage, no erythema, well approximated    Labs:  CBC:    Recent Labs     08/16/22  1138 08/17/22  0501   WBC 10.6 16.4*   HGB 15.3 12.4*   HCT 46.1 36.8*    302     BMP:    Recent Labs     08/16/22  1138      K 4.3      CO2 26   BUN 9   CREATININE 0.9   GLUCOSE 130*     Hepatic:  No results for input(s): AST, ALT, ALB, BILITOT, ALKPHOS in the last 72 hours. Amylase:  No results found for: AMYLASE  Lipase:  No results found for: LIPASE   Mag:  No results found for: MG  Phos:   No results found for: PHOS   Coags: No results found for: PROTIME, INR, APTT    Cultures:  Anaerobic culture  No results found for: LABANAE  Fungus stain  No results found for requested labs within last 30 days. Gram stain  No results found for requested labs within last 30 days. Organism  No results found for: [facility]  Surgical culture  No results found for: CXSURG  Blood culture 1  No results found for requested labs within last 30 days. Blood culture 2  No results found for requested labs within last 30 days. Fecal occult  No results found for requested labs within last 30 days. GI bacterial pathogens by PCR  No results found for requested labs within last 30 days. C. difficile  No results found for requested labs within last 30 days. Urine culture  No results found for: LABURIN    Pathology:  Pathology results pending     Imaging:  I have personally reviewed the following films:    No results found.     Scheduled Meds:   tamsulosin  0.4 mg Oral Daily    sodium chloride flush  5-40 mL IntraVENous 2 times per day    enoxaparin  40 mg SubCUTAneous Daily     Continuous Infusions:   dextrose 5% and 0.45% NaCl with KCl 20 mEq 100 mL/hr at 08/17/22 0248    sodium chloride       PRN Meds:.calcium carbonate, sodium chloride flush, sodium chloride, HYDROmorphone, ondansetron      Assessment:  OR Date 8/16/2022, robot-assist laparoscopic sigmoid colon resection with repair of colovesical fistula, ostomy creation, and appendectomy for colovesical fistula      Plan:  1. Pain controlled and no nausea, but complaints of dyspepsia and distended on exam, no stool output from ostomy yet, vitals/labs stable; continued supportive care, add PRN Tums  2. Decrease to NPO until dyspepsia and distention improve; monitor stoma output, education per WOC-RN  3. IV hydration; monitor and correct electrolytes  4. Activity as tolerated, ambulate TID, up to chair for all meals  5. Pulmonary toilet, incentive spirometry  6. PRN analgesics and antiemetics--minimizing narcotics as tolerated  7. DVT prophylaxis with  Lovenox and SCD's    EDUCATION:  Educated patient on plan of care and disease process--all questions answered. Plans discussed with patient and nursing. Reviewed and discussed with Dr. Vic Gonzalez. Signed:  CLEMENTINA Feliz CNP  8/17/2022 11:17 AM    Postop day #1 status post repair of colovesical fistula with sigmoid colon resection, coloproctostomy and diverting loop ileostomy. Doing well. Has abdominal distention consistent with mild ileus. Down to sips of clears. OOB.

## 2022-08-17 NOTE — PLAN OF CARE
Problem: Discharge Planning  Goal: Discharge to home or other facility with appropriate resources  Outcome: Progressing  Flowsheets  Taken 8/16/2022 2030 by Katrin Wood RN  Discharge to home or other facility with appropriate resources:   Identify barriers to discharge with patient and caregiver   Identify discharge learning needs (meds, wound care, etc)  Taken 8/16/2022 1837 by Marciano Junior RN  Discharge to home or other facility with appropriate resources: Identify barriers to discharge with patient and caregiver     Problem: Safety - Adult  Goal: Free from fall injury  Outcome: Progressing  Flowsheets (Taken 8/17/2022 0010)  Free From Fall Injury: Instruct family/caregiver on patient safety     Problem: ABCDS Injury Assessment  Goal: Absence of physical injury  Outcome: Progressing  Flowsheets (Taken 8/17/2022 0010)  Absence of Physical Injury: Implement safety measures based on patient assessment     Problem: Pain  Goal: Verbalizes/displays adequate comfort level or baseline comfort level  Outcome: Progressing

## 2022-08-17 NOTE — CARE COORDINATION
Discharge Planning Note:    Chart reviewed and it appears that patient has minimal needs for discharge at this time. Discussed with patient and requested that case management be notified if discharge needs are identified.     - Current discharge plan is for the patient to return home with no needs. Case management will continue to follow progress and update discharge plan as needed.       Risk of Readmission Score: 5%    DANNY Brambila RN    [facility]  Phone: [phone]

## 2022-08-17 NOTE — PROGRESS NOTES
Shift assessment complete see flow sheets meds per orders see eMAR. Patient alert and oriented x4, PRN pain meds effective for pain relieve. Patient declined to get out of bed this morning, educated and encouraged patient to get out of bed. He stated we would do it later. He is not interactive with or looking at his ostomy at this time. The care plan and education has been reviewed and mutually agreed upon with the patient. Patient remains free from falls. All fall precautions in place. Yellow blanket at bedside, yellow bracelet on patient. SAFE sign on door. Bed and chair alarms being used. Bed in lowest position. Will monitor.      Electronically signed by Mary Alvarado RN on 8/17/2022 at 6:33 AM

## 2022-08-18 LAB
ANION GAP SERPL CALCULATED.3IONS-SCNC: 6 MMOL/L (ref 3–16)
BUN BLDV-MCNC: 8 MG/DL (ref 7–20)
CALCIUM SERPL-MCNC: 9.4 MG/DL (ref 8.3–10.6)
CHLORIDE BLD-SCNC: 98 MMOL/L (ref 99–110)
CO2: 32 MMOL/L (ref 21–32)
CREAT SERPL-MCNC: 0.7 MG/DL (ref 0.8–1.3)
GFR AFRICAN AMERICAN: >60
GFR NON-AFRICAN AMERICAN: >60
GLUCOSE BLD-MCNC: 151 MG/DL (ref 70–99)
HCT VFR BLD CALC: 38.3 % (ref 40.5–52.5)
HEMOGLOBIN: 12.5 G/DL (ref 13.5–17.5)
MCH RBC QN AUTO: 28.6 PG (ref 26–34)
MCHC RBC AUTO-ENTMCNC: 32.7 G/DL (ref 31–36)
MCV RBC AUTO: 87.7 FL (ref 80–100)
PDW BLD-RTO: 13.5 % (ref 12.4–15.4)
PLATELET # BLD: 291 K/UL (ref 135–450)
PMV BLD AUTO: 7 FL (ref 5–10.5)
POTASSIUM SERPL-SCNC: 4.3 MMOL/L (ref 3.5–5.1)
RBC # BLD: 4.36 M/UL (ref 4.2–5.9)
SODIUM BLD-SCNC: 136 MMOL/L (ref 136–145)
WBC # BLD: 13.1 K/UL (ref 4–11)

## 2022-08-18 PROCEDURE — 99024 POSTOP FOLLOW-UP VISIT: CPT | Performed by: SURGERY

## 2022-08-18 PROCEDURE — 6360000002 HC RX W HCPCS: Performed by: SURGERY

## 2022-08-18 PROCEDURE — 2500000003 HC RX 250 WO HCPCS: Performed by: SURGERY

## 2022-08-18 PROCEDURE — 6370000000 HC RX 637 (ALT 250 FOR IP): Performed by: NURSE PRACTITIONER

## 2022-08-18 PROCEDURE — 1200000000 HC SEMI PRIVATE

## 2022-08-18 PROCEDURE — 2580000003 HC RX 258: Performed by: SURGERY

## 2022-08-18 PROCEDURE — 85027 COMPLETE CBC AUTOMATED: CPT

## 2022-08-18 PROCEDURE — 80048 BASIC METABOLIC PNL TOTAL CA: CPT

## 2022-08-18 PROCEDURE — 6370000000 HC RX 637 (ALT 250 FOR IP): Performed by: SURGERY

## 2022-08-18 RX ORDER — METOCLOPRAMIDE HYDROCHLORIDE 5 MG/ML
10 INJECTION INTRAMUSCULAR; INTRAVENOUS EVERY 6 HOURS
Status: DISCONTINUED | OUTPATIENT
Start: 2022-08-18 | End: 2022-08-25 | Stop reason: HOSPADM

## 2022-08-18 RX ADMIN — METOCLOPRAMIDE HYDROCHLORIDE 10 MG: 5 INJECTION INTRAMUSCULAR; INTRAVENOUS at 17:01

## 2022-08-18 RX ADMIN — HYDROMORPHONE HYDROCHLORIDE 1 MG: 1 INJECTION, SOLUTION INTRAMUSCULAR; INTRAVENOUS; SUBCUTANEOUS at 00:44

## 2022-08-18 RX ADMIN — TAMSULOSIN HYDROCHLORIDE 0.4 MG: 0.4 CAPSULE ORAL at 08:45

## 2022-08-18 RX ADMIN — Medication 10 ML: at 08:46

## 2022-08-18 RX ADMIN — HYDROMORPHONE HYDROCHLORIDE 1 MG: 1 INJECTION, SOLUTION INTRAMUSCULAR; INTRAVENOUS; SUBCUTANEOUS at 20:11

## 2022-08-18 RX ADMIN — ANTACID TABLETS 500 MG: 500 TABLET, CHEWABLE ORAL at 20:11

## 2022-08-18 RX ADMIN — HYDROMORPHONE HYDROCHLORIDE 1 MG: 1 INJECTION, SOLUTION INTRAMUSCULAR; INTRAVENOUS; SUBCUTANEOUS at 17:01

## 2022-08-18 RX ADMIN — Medication 10 ML: at 22:26

## 2022-08-18 RX ADMIN — HYDROMORPHONE HYDROCHLORIDE 1 MG: 1 INJECTION, SOLUTION INTRAMUSCULAR; INTRAVENOUS; SUBCUTANEOUS at 22:25

## 2022-08-18 RX ADMIN — METOCLOPRAMIDE HYDROCHLORIDE 10 MG: 5 INJECTION INTRAMUSCULAR; INTRAVENOUS at 22:25

## 2022-08-18 RX ADMIN — POTASSIUM CHLORIDE, DEXTROSE MONOHYDRATE AND SODIUM CHLORIDE: 150; 5; 450 INJECTION, SOLUTION INTRAVENOUS at 08:36

## 2022-08-18 RX ADMIN — METOCLOPRAMIDE HYDROCHLORIDE 10 MG: 5 INJECTION INTRAMUSCULAR; INTRAVENOUS at 12:19

## 2022-08-18 RX ADMIN — HYDROMORPHONE HYDROCHLORIDE 1 MG: 1 INJECTION, SOLUTION INTRAMUSCULAR; INTRAVENOUS; SUBCUTANEOUS at 14:01

## 2022-08-18 RX ADMIN — ONDANSETRON 4 MG: 2 INJECTION INTRAMUSCULAR; INTRAVENOUS at 14:02

## 2022-08-18 RX ADMIN — HYDROMORPHONE HYDROCHLORIDE 1 MG: 1 INJECTION, SOLUTION INTRAMUSCULAR; INTRAVENOUS; SUBCUTANEOUS at 05:40

## 2022-08-18 RX ADMIN — ONDANSETRON 4 MG: 2 INJECTION INTRAMUSCULAR; INTRAVENOUS at 00:46

## 2022-08-18 RX ADMIN — ENOXAPARIN SODIUM 40 MG: 100 INJECTION SUBCUTANEOUS at 08:45

## 2022-08-18 RX ADMIN — ANTACID TABLETS 500 MG: 500 TABLET, CHEWABLE ORAL at 00:44

## 2022-08-18 RX ADMIN — HYDROMORPHONE HYDROCHLORIDE 1 MG: 1 INJECTION, SOLUTION INTRAMUSCULAR; INTRAVENOUS; SUBCUTANEOUS at 08:46

## 2022-08-18 RX ADMIN — ONDANSETRON 4 MG: 2 INJECTION INTRAMUSCULAR; INTRAVENOUS at 20:11

## 2022-08-18 ASSESSMENT — PAIN SCALES - GENERAL
PAINLEVEL_OUTOF10: 8
PAINLEVEL_OUTOF10: 9
PAINLEVEL_OUTOF10: 7
PAINLEVEL_OUTOF10: 8

## 2022-08-18 ASSESSMENT — PAIN DESCRIPTION - ORIENTATION
ORIENTATION: MID
ORIENTATION: MID

## 2022-08-18 ASSESSMENT — PAIN DESCRIPTION - LOCATION
LOCATION: ABDOMEN

## 2022-08-18 ASSESSMENT — PAIN DESCRIPTION - DESCRIPTORS
DESCRIPTORS: ACHING;BURNING
DESCRIPTORS: ACHING
DESCRIPTORS: ACHING

## 2022-08-18 NOTE — PROGRESS NOTES
Patient nauseated, minimal output to ostomy, zofran given. No gas in ostomy. Abdomen firm.     Electronically signed by Taz Manriquez RN on 8/18/2022 at 12:54 AM

## 2022-08-18 NOTE — CARE COORDINATION
Patient has new loop ileostomy. Patient not feeling well at this time. Nurse medicated patient for pain and nausea. Written information regarding ileostomy, and  a stoma skills kit left with patient to read. Stoma orders written. Will continue ostomy education tomorrow.  JEAN CARLOS Deleon, BSN, RN, [facility], [address] [facility]  [phone]

## 2022-08-18 NOTE — PROGRESS NOTES
Shift assessment completed. VSS. Patient alert and oriented x 4. Incision sites to abdomen clean, dry and intact. Denies having nausea at this time. The care plan and education have been reviewed and mutually agreed upon with the patient. Call light within reach.

## 2022-08-18 NOTE — PLAN OF CARE
Problem: Discharge Planning  Goal: Discharge to home or other facility with appropriate resources  8/18/2022 1008 by Courtney Yoo RN  Outcome: Progressing  8/17/2022 2336 by Negin Pittman RN  Outcome: Progressing     Problem: Safety - Adult  Goal: Free from fall injury  8/18/2022 1008 by Courtney Yoo RN  Outcome: Progressing  8/17/2022 2336 by Negin Pittman RN  Outcome: Progressing     Problem: ABCDS Injury Assessment  Goal: Absence of physical injury  8/18/2022 1008 by Courtney Yoo RN  Outcome: Progressing  8/17/2022 2336 by Negin Pittman RN  Outcome: Progressing     Problem: Pain  Goal: Verbalizes/displays adequate comfort level or baseline comfort level  8/18/2022 1008 by Courtney Yoo RN  Outcome: Progressing  8/17/2022 2336 by Negin Pittman RN  Outcome: Progressing

## 2022-08-18 NOTE — PROGRESS NOTES
Josef Weldon is a 74 y.o. male patient. Current Facility-Administered Medications   Medication Dose Route Frequency Provider Last Rate Last Admin    calcium carbonate (TUMS) chewable tablet 500 mg  500 mg Oral TID PRN CLEMENTINA Parker CNP   500 mg at 08/18/22 0044    tamsulosin (FLOMAX) capsule 0.4 mg  0.4 mg Oral Daily Lion Coats MD   0.4 mg at 08/18/22 0845    dextrose 5 % and 0.45 % NaCl with KCl 20 mEq infusion   IntraVENous Continuous Lion Coats  mL/hr at 08/18/22 0836 New Bag at 08/18/22 0836    sodium chloride flush 0.9 % injection 5-40 mL  5-40 mL IntraVENous 2 times per day Lion Coats MD   10 mL at 08/18/22 0846    sodium chloride flush 0.9 % injection 5-40 mL  5-40 mL IntraVENous PRN Lion Coats MD        0.9 % sodium chloride infusion   IntraVENous PRN Lion Coats MD        HYDROmorphone HCl PF (DILAUDID) injection 1 mg  1 mg IntraVENous Q2H PRN Lion Coats MD   1 mg at 08/18/22 0846    enoxaparin (LOVENOX) injection 40 mg  40 mg SubCUTAneous Daily Lion Coats MD   40 mg at 08/18/22 0845    ondansetron (ZOFRAN) injection 4 mg  4 mg IntraVENous Q6H PRN Lion Coats MD   4 mg at 08/18/22 0046     No Known Allergies  Principal Problem:    Colovesical fistula  Resolved Problems:    * No resolved hospital problems. *    Blood pressure 109/67, pulse 75, temperature 98.7 °F (37.1 °C), temperature source Oral, resp. rate 18, height 6' (1.829 m), weight 200 lb 3.2 oz (90.8 kg), SpO2 91 %. Subjective:  Symptoms:  Stable. Diet:  NPO. Activity level: Impaired due to weakness. Pain:  He complains of pain that is mild. Objective:  General Appearance:  Comfortable. Vital signs: (most recent): Blood pressure 109/67, pulse 75, temperature 98.7 °F (37.1 °C), temperature source Oral, resp. rate 18, height 6' (1.829 m), weight 200 lb 3.2 oz (90.8 kg), SpO2 91 %. Output: Producing urine and minimal stool output. Lungs:  Normal effort and normal respiratory rate. Abdomen: Abdomen is soft and distended. (Incisions approximated without evidence of infection. ). Neurological: Patient is alert and oriented to person, place and time. Skin:  Warm and dry. Assessment & Plan 25-year-old male who is postop day #2 status post robotic repair of colovesical fistula with sigmoid colon resection, coloproctostomy and diverting loop ileostomy. Doing fairly well. Continues to have abdominal distention consistent with an ileus. Hold diet at sips of clear liquids. Add IV Reglan. Increase ambulation.       Gerhardt Plowman, MD  8/18/2022

## 2022-08-18 NOTE — PROGRESS NOTES
Attempted several times to encourage patient to ambulate or sit up in recliner. Patient declined at this time reporting nausea and pain. PRN medications given. [phone]  Patient up to chair. Nausea has improved.

## 2022-08-19 ENCOUNTER — APPOINTMENT (OUTPATIENT)
Dept: GENERAL RADIOLOGY | Age: 67
DRG: 330 | End: 2022-08-19
Attending: SURGERY
Payer: MEDICARE

## 2022-08-19 LAB
ANION GAP SERPL CALCULATED.3IONS-SCNC: 9 MMOL/L (ref 3–16)
BUN BLDV-MCNC: 6 MG/DL (ref 7–20)
CALCIUM SERPL-MCNC: 9.4 MG/DL (ref 8.3–10.6)
CHLORIDE BLD-SCNC: 98 MMOL/L (ref 99–110)
CO2: 29 MMOL/L (ref 21–32)
CREAT SERPL-MCNC: 0.7 MG/DL (ref 0.8–1.3)
GFR AFRICAN AMERICAN: >60
GFR NON-AFRICAN AMERICAN: >60
GLUCOSE BLD-MCNC: 164 MG/DL (ref 70–99)
HCT VFR BLD CALC: 39.1 % (ref 40.5–52.5)
HEMOGLOBIN: 12.8 G/DL (ref 13.5–17.5)
MCH RBC QN AUTO: 28.4 PG (ref 26–34)
MCHC RBC AUTO-ENTMCNC: 32.7 G/DL (ref 31–36)
MCV RBC AUTO: 87 FL (ref 80–100)
PDW BLD-RTO: 13.3 % (ref 12.4–15.4)
PLATELET # BLD: 314 K/UL (ref 135–450)
PMV BLD AUTO: 6.9 FL (ref 5–10.5)
POTASSIUM SERPL-SCNC: 4.3 MMOL/L (ref 3.5–5.1)
RBC # BLD: 4.49 M/UL (ref 4.2–5.9)
SODIUM BLD-SCNC: 136 MMOL/L (ref 136–145)
WBC # BLD: 14.9 K/UL (ref 4–11)

## 2022-08-19 PROCEDURE — 85027 COMPLETE CBC AUTOMATED: CPT

## 2022-08-19 PROCEDURE — 6370000000 HC RX 637 (ALT 250 FOR IP): Performed by: SURGERY

## 2022-08-19 PROCEDURE — 2580000003 HC RX 258: Performed by: SURGERY

## 2022-08-19 PROCEDURE — APPNB30 APP NON BILLABLE TIME 0-30 MINS: Performed by: NURSE PRACTITIONER

## 2022-08-19 PROCEDURE — 80048 BASIC METABOLIC PNL TOTAL CA: CPT

## 2022-08-19 PROCEDURE — 1200000000 HC SEMI PRIVATE

## 2022-08-19 PROCEDURE — 6360000002 HC RX W HCPCS: Performed by: SURGERY

## 2022-08-19 PROCEDURE — 74019 RADEX ABDOMEN 2 VIEWS: CPT

## 2022-08-19 PROCEDURE — APPSS15 APP SPLIT SHARED TIME 0-15 MINUTES: Performed by: NURSE PRACTITIONER

## 2022-08-19 RX ADMIN — METOCLOPRAMIDE HYDROCHLORIDE 10 MG: 5 INJECTION INTRAMUSCULAR; INTRAVENOUS at 23:28

## 2022-08-19 RX ADMIN — HYDROMORPHONE HYDROCHLORIDE 1 MG: 1 INJECTION, SOLUTION INTRAMUSCULAR; INTRAVENOUS; SUBCUTANEOUS at 21:43

## 2022-08-19 RX ADMIN — Medication 10 ML: at 23:01

## 2022-08-19 RX ADMIN — HYDROMORPHONE HYDROCHLORIDE 1 MG: 1 INJECTION, SOLUTION INTRAMUSCULAR; INTRAVENOUS; SUBCUTANEOUS at 13:47

## 2022-08-19 RX ADMIN — METOCLOPRAMIDE HYDROCHLORIDE 10 MG: 5 INJECTION INTRAMUSCULAR; INTRAVENOUS at 18:57

## 2022-08-19 RX ADMIN — Medication 10 ML: at 09:00

## 2022-08-19 RX ADMIN — HYDROMORPHONE HYDROCHLORIDE 1 MG: 1 INJECTION, SOLUTION INTRAMUSCULAR; INTRAVENOUS; SUBCUTANEOUS at 18:57

## 2022-08-19 RX ADMIN — METOCLOPRAMIDE HYDROCHLORIDE 10 MG: 5 INJECTION INTRAMUSCULAR; INTRAVENOUS at 05:03

## 2022-08-19 RX ADMIN — TAMSULOSIN HYDROCHLORIDE 0.4 MG: 0.4 CAPSULE ORAL at 08:59

## 2022-08-19 RX ADMIN — ENOXAPARIN SODIUM 40 MG: 100 INJECTION SUBCUTANEOUS at 08:59

## 2022-08-19 RX ADMIN — HYDROMORPHONE HYDROCHLORIDE 1 MG: 1 INJECTION, SOLUTION INTRAMUSCULAR; INTRAVENOUS; SUBCUTANEOUS at 00:57

## 2022-08-19 RX ADMIN — METOCLOPRAMIDE HYDROCHLORIDE 10 MG: 5 INJECTION INTRAMUSCULAR; INTRAVENOUS at 11:30

## 2022-08-19 RX ADMIN — HYDROMORPHONE HYDROCHLORIDE 1 MG: 1 INJECTION, SOLUTION INTRAMUSCULAR; INTRAVENOUS; SUBCUTANEOUS at 05:03

## 2022-08-19 RX ADMIN — ONDANSETRON 4 MG: 2 INJECTION INTRAMUSCULAR; INTRAVENOUS at 02:09

## 2022-08-19 RX ADMIN — HYDROMORPHONE HYDROCHLORIDE 1 MG: 1 INJECTION, SOLUTION INTRAMUSCULAR; INTRAVENOUS; SUBCUTANEOUS at 11:29

## 2022-08-19 RX ADMIN — HYDROMORPHONE HYDROCHLORIDE 1 MG: 1 INJECTION, SOLUTION INTRAMUSCULAR; INTRAVENOUS; SUBCUTANEOUS at 08:59

## 2022-08-19 ASSESSMENT — PAIN DESCRIPTION - ORIENTATION
ORIENTATION: UPPER

## 2022-08-19 ASSESSMENT — PAIN SCALES - GENERAL
PAINLEVEL_OUTOF10: 8
PAINLEVEL_OUTOF10: 6
PAINLEVEL_OUTOF10: 7
PAINLEVEL_OUTOF10: 8

## 2022-08-19 ASSESSMENT — PAIN DESCRIPTION - DESCRIPTORS
DESCRIPTORS: ACHING

## 2022-08-19 ASSESSMENT — PAIN DESCRIPTION - LOCATION
LOCATION: ABDOMEN

## 2022-08-19 NOTE — PROGRESS NOTES
[facility] Laparoscopic Surgery        Progress Note    Patient Name: Vianney Fowler  MRN: [id number]  [place]: 1955  Date of Evaluation: 2022    Subjective:  No acute events overnight  Pain controlled  Reports nausea and a couple vomiting episodes yesterday, none today; denies feeling hungry  Enteric output from ostomy--more than day prior  Up to chair at this time    Post-Operative Day #3      Vital Signs:  Patient Vitals for the past 24 hrs:   BP Temp Temp src Pulse Resp SpO2   22 1129 -- -- -- -- 16 --   22 0512 105/64 97.5 °F (36.4 °C) Oral 77 20 (!) 86 %   22 005 -- -- -- -- 18 --   22 -- -- -- -- 18 --   22 194 136/83 98.2 °F (36.8 °C) Oral 72 18 90 %   22 181 127/72 98.2 °F (36.8 °C) Oral 76 18 92 %        TEMPERATURE HISTORY 24H: Temp (24hrs), Av °F (36.7 °C), Min:97.5 °F (36.4 °C), Max:98.2 °F (36.8 °C)    BLOOD PRESSURE HISTORY: Systolic (24BOP), LISANDRO:018 , Min:105 , CIS:045    Diastolic (14BOF), ROSANNE:51, Min:64, Max:83      Intake/Output:  I/O last 3 completed shifts:  In: -   Out: 8022 [Urine:1050; Stool:600]  No intake/output data recorded. Drain/tube Output:       Physical Exam:  General: awake, alert, oriented to person, place, time  Lungs: unlabored respirations  Abdomen: soft, mildly distended, incisional tenderness only, bowel sounds present, stoma pink/viable, enteric output in pouch  Skin/Wound: healing well, no drainage, no erythema, well approximated    Labs:  CBC:    Recent Labs     22  0501 22  0514 22  0529   WBC 16.4* 13.1* 14.9*   HGB 12.4* 12.5* 12.8*   HCT 36.8* 38.3* 39.1*    291 314       BMP:    Recent Labs     22  0514 22  0529    136   K 4.3 4.3   CL 98* 98*   CO2 32 29   BUN 8 6*   CREATININE 0.7* 0.7*   GLUCOSE 151* 164*       Hepatic:  No results for input(s): AST, ALT, ALB, BILITOT, ALKPHOS in the last 72 hours.   Amylase:  No results found for: AMYLASE  Lipase:  No results found for: LIPASE   Mag:  No results found for: MG  Phos:   No results found for: PHOS   Coags: No results found for: PROTIME, INR, APTT    Cultures:  Anaerobic culture  No results found for: LABANAE  Fungus stain  No results found for requested labs within last 30 days. Gram stain  No results found for requested labs within last 30 days. Organism  No results found for: [facility]  Surgical culture  No results found for: CXSURG  Blood culture 1  No results found for requested labs within last 30 days. Blood culture 2  No results found for requested labs within last 30 days. Fecal occult  No results found for requested labs within last 30 days. GI bacterial pathogens by PCR  No results found for requested labs within last 30 days. C. difficile  No results found for requested labs within last 30 days. Urine culture  No results found for: LABURIN    Pathology:  OR 8/16/2022--FINAL DIAGNOSIS:     A. Appendix, appendectomy:   - No diagnostic abnormality. B.  Sigmoid colon, resection:   - Acute diverticulitis with abscess and sinus tract formation.   - Negative for malignancy. C.  Additional proximal segments and rings:   - No diagnostic abnormality. Imaging:  I have personally reviewed the following films:    XR ABDOMEN (2 VIEWS)    Result Date: 8/19/2022  EXAMINATION: TWO XRAY VIEWS OF THE ABDOMEN 8/19/2022 12:44 pm COMPARISON: None HISTORY: ORDERING SYSTEM PROVIDED HISTORY: Abdominal distention TECHNOLOGIST PROVIDED HISTORY: Reason for exam:->Abdominal distention Reason for Exam: Abdominal distention FINDINGS: No evidence of pneumoperitoneum. Diffuse dilated, air-filled loops of small bowel are seen throughout the abdomen. Paucity of air in the visualized colon. No abnormal soft tissue mass or calcification. No significant skeletal finding. Dilated, air-filled loops of small bowel diffusely.   Differential would include either ileus or a developing bowel obstruction. Scheduled Meds:   metoclopramide  10 mg IntraVENous Q6H    tamsulosin  0.4 mg Oral Daily    sodium chloride flush  5-40 mL IntraVENous 2 times per day    enoxaparin  40 mg SubCUTAneous Daily     Continuous Infusions:   dextrose 5% and 0.45% NaCl with KCl 20 mEq 100 mL/hr at 08/18/22 0836    sodium chloride       PRN Meds:.calcium carbonate, sodium chloride flush, sodium chloride, HYDROmorphone, ondansetron      Assessment:  OR Date 8/16/2022, robot-assist laparoscopic sigmoid colon resection with repair of colovesical fistula, ostomy creation, and appendectomy for colovesical fistula  Postoperative ileus      Plan:  1. Pain controlled, no further nausea/vomiting, increased stool output from ostomy, only mild distention on exam, vitals stable, increased leukocytosis, AXR reviewed; continued supportive care, continue Reglan, repeat labs tomorrow  2. Start clear liquid diet as tolerated; monitor stoma output, education per WOC-RN  3. IV hydration until PO intake is adequate; monitor and correct electrolytes  4. Activity as tolerated, ambulate TID, up to chair for all meals  5. Pulmonary toilet, incentive spirometry  6. PRN analgesics and antiemetics--minimizing narcotics as tolerated  7. DVT prophylaxis with  Lovenox and SCD's    EDUCATION:  Educated patient on plan of care and disease process--all questions answered. Plans discussed with patient and nursing. Reviewed and discussed with Dr. Regino Garcia.       Signed:  CLEMENTINA Pelaez CNP  8/19/2022 1:36 PM

## 2022-08-19 NOTE — DISCHARGE INSTR - COC
Continuity of Care Form    Patient Name: Manjit Kraft   :  1955  MRN:  [id number]    Admit date:  2022  Discharge date:  2022    Code Status Order: Full Code   Advance Directives:     Admitting Physician:  Beti Young MD  PCP: Luisito Gupta DO    Discharging Nurse: [facility] Unit/Room#: 0AQ-1934/5966-38  Discharging Unit Phone Number: [phone]    Emergency Contact:   Extended Emergency Contact Information  Primary Emergency Contact: [facility] Phone: [phone]  Relation: Domestic Partner    Past Surgical History:  Past Surgical History:   Procedure Laterality Date    COLECTOMY N/A 2022    ROBOT ASSIST LAPAROSCOPIC SIGMOID COLON RESECTION WITH REPAIR OF COLOVESICAL FISTULA, APPENDECTOMY, LOOP ILEOSTOMY performed by Beti Young MD at [facility] [employer]       Immunization History: There is no immunization history on file for this patient. Active Problems:  Patient Active Problem List   Diagnosis Code    Colovesical fistula N32.1       Isolation/Infection:   Isolation            No Isolation          Patient Infection Status       None to display            Nurse Assessment:  Last Vital Signs: /72   Pulse 79   Temp 97.6 °F (36.4 °C) (Oral)   Resp 16   Ht 6' (1.829 m)   Wt 200 lb 3.2 oz (90.8 kg)   SpO2 92%   BMI 27.15 kg/m²     Last documented pain score (0-10 scale): Pain Level: 7  Last Weight:   Wt Readings from Last 1 Encounters:   22 200 lb 3.2 oz (90.8 kg)     Mental Status:  oriented and thought processes intact    IV Access:  - None    Nursing Mobility/ADLs:  Walking   Independent  Transfer  Independent  Bathing  Assisted  Dressing  Assisted  Toileting  Assisted  Feeding  Independent  Med Admin  Independent  Med Delivery   whole    Wound Care Documentation and Therapy:  Incision 22 Abdomen Mid;Anterior (Active)   Dressing Status Clean;Dry; Intact 22   Dressing/Treatment Surgical glue 22 Closure Surgical glue 08/18/22 2107   Margins Approximated 08/18/22 2107   Drainage Amount None 08/18/22 2107   Odor None 08/18/22 2107   Number of days: 3        Elimination:  Continence: Bowel: Yes  Bladder: Yes  Urinary Catheter: None   Colostomy/Ileostomy/Ileal Conduit: Yes  Colostomy RUQ Loop-Stomal Appliance: 2 piece  Colostomy RUQ Loop-Stoma  Assessment: Pink, Moist, Protrudes  Colostomy RUQ Loop-Peristomal Assessment: Clean, dry & intact  Colostomy RUQ Loop-Treatment: Bag change  Colostomy RUQ Loop-Stool Appearance: Watery  Colostomy RUQ Loop-Stool Color: Lennis Angelucci  Colostomy RUQ Loop-Stool Amount: Small  Colostomy RUQ Loop-Output (mL): 150 ml  Patient has new  ostomy, please have home health nurse continue to reinforce ostomy teaching.]  Plan for Ostomy Care:    ILEOSTOMY CARE   Supplies: pouch Coloplast #[id number], wafer #[id number]   Empty pouch when it is 1/3 to 1/2 full of gas or/and stool, this will be approximately 5 to 8 times daily. Change ileostomy dressing every 3 to 4 days. Change ileostomy dressing whenever it leaks to prevent skin damage. Gather all supplies : scissors, wafer, pouch, towels, tissue. Measure stoma with each dressing change (stoma 45 mm tall about 42mm wide, oval shape with red nereida in place, measured 8-19-22)  For pouch changes:  empty pouch, then remove. Clean skin with water only and dry. DO NOT USE WIPES!! Place tissue over stoma, in case you have output while changing. Measure stoma. Transfer measurements to wafer. Cut wafer to fit snug not tight to stoma   To place pouch, remove paper from adhesive side, line up adhesive side in the blue  line of wafer. Close end of pouch.   Date of Last BM: 8/25      Intake/Output Summary (Last 24 hours) at 8/19/2022 1628  Last data filed at 8/19/2022 1658  Gross per 24 hour   Intake --   Output 1500 ml   Net -1500 ml     I/O last 3 completed shifts:  In: -   Out: 7310 [Urine:1050; Stool:600]    Safety Concerns:     None and at risk for skin breakdown if ostomy is mismanaged    Impairments/Disabilities:      None    Nutrition Therapy:  Current Nutrition Therapy:   - Oral Diet:  General    Routes of Feeding: Oral  Liquids: No Restrictions  Daily Fluid Restriction: no  Last Modified Barium Swallow with Video (Video Swallowing Test): not done    Treatments at the Time of Hospital Discharge:   Respiratory Treatments: no    Oxygen Therapy:  is not on home oxygen therapy. Ventilator:    - No ventilator support    Rehab Therapies: ostomy care and training  Weight Bearing Status/Restrictions: No weight bearing restrictions  Other Medical Equipment (for information only, NOT a DME order):  ostomy supplies  Other Treatments: ostomy caare and training    Patient's personal belongings (please select all that are sent with patient):  Glasses    RN SIGNATURE:  Electronically signed by Loretta Kapoor RN on 8/25/22 at 1:09 PM EDT    CASE MANAGEMENT/SOCIAL WORK SECTION    Inpatient Status Date: 08/16/222    Readmission Risk Assessment Score:  Readmission Risk              Risk of Unplanned Readmission:  7           Discharging to Facility/ Agency   Name: [facility]  Address: [facility], [address] [place] [address]  Phone: [phone]  Fax:      / signature: Electronically signed by Elizabeth Khan RN on 8/25/22 at 11:56 AM EDT    PHYSICIAN SECTION    Prognosis: Good    Condition at Discharge: Stable    Rehab Potential (if transferring to Rehab): Good    Recommended Labs or Other Treatments After Discharge: New ostomy care/education    Physician Certification: I certify the above information and transfer of Elida Cross  is necessary for the continuing treatment of the diagnosis listed and that he requires Home Care for less 30 days.      Update Admission H&P: No change in H&P    PHYSICIAN SIGNATURE:  Electronically signed by CLEMENTINA Christianson CNP on 8/24/22 at 10:55 AM EDT

## 2022-08-19 NOTE — CARE COORDINATION
Ostomy Referral Follow-up Progress Note      NAME:  Jose Lewis RECORD NUMBER:  [id number]  AGE: 74 y.o. GENDER:  male  :  1955  TODAY'S DATE:  2022    Subjective: Vianney Fowler is a 74 y.o. male referred by:   [] Physician  [] Nursing  [] Other:     New and Established    Objective    /72   Pulse 79   Temp 97.6 °F (36.4 °C) (Oral)   Resp 16   Ht 6' (1.829 m)   Wt 200 lb 3.2 oz (90.8 kg)   SpO2 92%   BMI 27.15 kg/m²     Jayro Risk Score Jayro Scale Score: 20    Assessment   Surgeon Hi More    Ileostomy            Colostomy      Colostomy RUQ Loop (Active)   Stomal Appliance 2 piece 22   Stoma  Assessment Pink;Moist;Protrudes 22   Peristomal Assessment Clean, dry & intact 22   Treatment Bag change 22 1618   Stool Appearance Watery 22 0512   Stool Color Brown;Green 22 0512   Stool Amount Small 22   Output (mL) 150 ml 22 0512   Number of days: 2     Patient seen for ostomy education. Today patient reports his wife will be taking care of his ostomy and she was not here. Also patient reports ostomy changed today. This nurse did assess stoma, measured and took picture. Stoma is pink, moist and protrudes above skin line. There is a soft red nereida in place. Os is in middle. Stoma is ~42mm tall, and 45mm wide. Spoke to nurse Radha. Patient maybe discharged this  weekend. Per nurse patient does not show an interest in ostomy education, patient says his wife will take care of it. Told nurse my concern that patient will need home health to help with care of new ostomy and to continue education. Also patient will need prescription for ostomy supplies. Nurse Amilcar Akhtar will reach out to case management.  Will be able to meet with patient and his wife Monday morning at 10 am.          Urostomy               Intake/Output Summary (Last 24 hours) at 2022 1548  Last data filed at 2022 0512  Gross per 24 hour   Intake --   Output 1500 ml   Net -1500 ml       Plan:   Plan for Ostomy Care:    ILEOSTOMY CARE   Supplies: pouch Coloplast #[id number], wafer #[id number]   Empty pouch when it is 1/3 to 1/2 full of gas or/and stool, this will be approximately 5 to 8 times daily. Change ileostomy dressing every 3 to 4 days. Change ileostomy dressing whenever it leaks to prevent skin damage. Measure stoma with each dressing change (stoma 45 mm tall about 42mm wide, oval shape with red nereida in place, measured 8-19-22)  For pouch changes:  empty pouch, then remove. Clean skin with water only and dry. DO NOT USE WIPES!! Measure stoma. Transfer measurements to wafer. Cut wafer to fit snug not tight to stoma   To place pouch, remove paper from adhesive side, line up adhesive side in the blue  line of wafer. Close end of pouch. Ostomy Plan of Care  [x] Supplies/Instructions left in room  [] Patient using home supplies  [x] Brand/supplies at bedside pouch Coloplast #[id number], wafer #[id number]      Current Diet: ADULT DIET;  Clear Liquid  Dietician consult:  No    Discharge Plan:  Placement for patient upon discharge: home with support    Outpatient visit plan No  Supplies given Yes   Samples requested No    Referrals:  [x]   [x] [facility]  [] Supplies  [] Other      Patient/Caregiver Teaching:  Written Instructions given to patient/family: Patient   Teaching provided:  [] Reviewed GI and A&P        [x] Supplies  [x] Pouch emptying      [] Manipulate closure  [x] Routine Care         [] Comment  [x] Pouch maintenance           Level of patient/caregiver understanding able to:  [x] Indicates understanding       [x] Needs reinforcement  [] Unsuccessful      [] Verbal Understanding  [] Demonstrated understanding       [] No evidence of learning  [] Refused teaching         [] N/A    Electronically signed by  WILLIAM GallegosN, RN, [facility], [facility] [address]  Wound/Ostomy Care  [phone] on 8/19/2022 at 3:48 PM

## 2022-08-19 NOTE — DISCHARGE INSTRUCTIONS
uncontrolled, either contact the office or present to nearest ED  - It is common to have bruising or mild redness around the wounds within the first few days after surgery. If it worsens, or starts draining, do not hesitate to contact the office  - May shower but no tub baths or swimming pools--do not submerge incisions under water    Cautions  - Watch for signs of infection, such as: fever over 100.5, excessive warmth or redness around incisions, bloody or cloudy drainage from incisions  - Watch for signs of complication: uncontrolled pain, nausea, bloating, sudden lightheadedness  - Serious problems can arise after surgery that need urgent or immediate care  - Do not hesitate to contact the office with any questions    Follow-up with your surgeon in  2 weeks. Call [phone] to schedule follow-up visit. ILEOSTOMY CARE   Empty pouch when it is 1/3 to 1/2 full of gas or/and stool, this will be approximately 5 to 8 times daily. Change ileostomy dressing every 3 to 4 days. Change ileostomy dressing whenever it leaks to prevent skin damage. Measure stoma with each dressing change (40 mm high x 48 mm wide on 8-22-22), twice weekly, until 9-13-22. Measure stoma with every other dressing change (once weekly) for 4 weeks, from 9-13-22 to 10-11-22. Measure stoma once every two weeks for 4 more weeks (from 10-11-22 to 11-8-22) then only measure once monthly. ILEOSTOMY DRESSING CHANGE   1. Gather all supplies for dressing change; scissors, coloplast [id number] & [id number]. (Can use larger coloplast [id number] & [id number] or 1-piece #[id number].)   2. Empty pouch. 3. Remove old dressing. 4. Clean stoma and surrounding skin with warm water only- no soap or bath wipes. 5. Dry stoma and surrounding skin & leave dry cloth on stoma to contain any new stool. 6. Measure stoma (40 mm high x 48 mm wide on 8-22-22). 7. Cut new dressing to fit stoma size.     8. Warm new dressing for 30 seconds by rubbing between hands or placing in heating pad. 9. Re-clean & dry stoma & surrounding skin, if needed. 10. Apply new dressing with dressing tab facing upper abdomen. (Save back of dressing for template for next stoma measurement.)   11. Line white adhesive of drainage pouch up with blue [employer] on dressing Landing Pad, apply & check for secure attachment with fingers like checking ziplock bags or tupperware. 12. Place heating pad over new dressing on abdomen for 3 to 5 minutes to promote adherence.

## 2022-08-19 NOTE — PROGRESS NOTES
Patient resting quietly, VSS, alert and oriented. Pain controlled with PRN pain medication. Patient willing to ambulate in fowler 1x this shift and sat up in chair for one hour. Ostomy intact with stool and gas output. Patient not willing to learn about taking care of ostomy, states that \"his wife will do it\". This RN made him aware that his wife will be at work and he will be in charge of ostomy at home. Estrada intact, leg bag in room to change on day of discharge. Patient stated he wants his wife in the room to learn how to switch from leg bag to standard bag. Patient from home to home.

## 2022-08-20 LAB
ANION GAP SERPL CALCULATED.3IONS-SCNC: 6 MMOL/L (ref 3–16)
BUN BLDV-MCNC: 5 MG/DL (ref 7–20)
CALCIUM SERPL-MCNC: 9.6 MG/DL (ref 8.3–10.6)
CHLORIDE BLD-SCNC: 98 MMOL/L (ref 99–110)
CO2: 34 MMOL/L (ref 21–32)
CREAT SERPL-MCNC: 0.7 MG/DL (ref 0.8–1.3)
GFR AFRICAN AMERICAN: >60
GFR NON-AFRICAN AMERICAN: >60
GLUCOSE BLD-MCNC: 146 MG/DL (ref 70–99)
HCT VFR BLD CALC: 35.7 % (ref 40.5–52.5)
HEMOGLOBIN: 12.1 G/DL (ref 13.5–17.5)
MCH RBC QN AUTO: 29.3 PG (ref 26–34)
MCHC RBC AUTO-ENTMCNC: 33.8 G/DL (ref 31–36)
MCV RBC AUTO: 86.6 FL (ref 80–100)
PDW BLD-RTO: 13.6 % (ref 12.4–15.4)
PLATELET # BLD: 300 K/UL (ref 135–450)
PMV BLD AUTO: 7.3 FL (ref 5–10.5)
POTASSIUM SERPL-SCNC: 4.7 MMOL/L (ref 3.5–5.1)
RBC # BLD: 4.13 M/UL (ref 4.2–5.9)
SODIUM BLD-SCNC: 138 MMOL/L (ref 136–145)
WBC # BLD: 10.7 K/UL (ref 4–11)

## 2022-08-20 PROCEDURE — 99024 POSTOP FOLLOW-UP VISIT: CPT | Performed by: SURGERY

## 2022-08-20 PROCEDURE — 85027 COMPLETE CBC AUTOMATED: CPT

## 2022-08-20 PROCEDURE — 6360000002 HC RX W HCPCS: Performed by: SURGERY

## 2022-08-20 PROCEDURE — 2500000003 HC RX 250 WO HCPCS: Performed by: SURGERY

## 2022-08-20 PROCEDURE — 2580000003 HC RX 258: Performed by: SURGERY

## 2022-08-20 PROCEDURE — 36415 COLL VENOUS BLD VENIPUNCTURE: CPT

## 2022-08-20 PROCEDURE — 6370000000 HC RX 637 (ALT 250 FOR IP): Performed by: SURGERY

## 2022-08-20 PROCEDURE — 80048 BASIC METABOLIC PNL TOTAL CA: CPT

## 2022-08-20 PROCEDURE — 1200000000 HC SEMI PRIVATE

## 2022-08-20 RX ADMIN — ENOXAPARIN SODIUM 40 MG: 100 INJECTION SUBCUTANEOUS at 08:42

## 2022-08-20 RX ADMIN — POTASSIUM CHLORIDE, DEXTROSE MONOHYDRATE AND SODIUM CHLORIDE: 150; 5; 450 INJECTION, SOLUTION INTRAVENOUS at 04:24

## 2022-08-20 RX ADMIN — POTASSIUM CHLORIDE, DEXTROSE MONOHYDRATE AND SODIUM CHLORIDE: 150; 5; 450 INJECTION, SOLUTION INTRAVENOUS at 14:50

## 2022-08-20 RX ADMIN — HYDROMORPHONE HYDROCHLORIDE 1 MG: 1 INJECTION, SOLUTION INTRAMUSCULAR; INTRAVENOUS; SUBCUTANEOUS at 21:39

## 2022-08-20 RX ADMIN — HYDROMORPHONE HYDROCHLORIDE 1 MG: 1 INJECTION, SOLUTION INTRAMUSCULAR; INTRAVENOUS; SUBCUTANEOUS at 15:42

## 2022-08-20 RX ADMIN — METOCLOPRAMIDE HYDROCHLORIDE 10 MG: 5 INJECTION INTRAMUSCULAR; INTRAVENOUS at 06:25

## 2022-08-20 RX ADMIN — METOCLOPRAMIDE HYDROCHLORIDE 10 MG: 5 INJECTION INTRAMUSCULAR; INTRAVENOUS at 17:38

## 2022-08-20 RX ADMIN — HYDROMORPHONE HYDROCHLORIDE 1 MG: 1 INJECTION, SOLUTION INTRAMUSCULAR; INTRAVENOUS; SUBCUTANEOUS at 11:50

## 2022-08-20 RX ADMIN — METOCLOPRAMIDE HYDROCHLORIDE 10 MG: 5 INJECTION INTRAMUSCULAR; INTRAVENOUS at 11:50

## 2022-08-20 RX ADMIN — TAMSULOSIN HYDROCHLORIDE 0.4 MG: 0.4 CAPSULE ORAL at 08:42

## 2022-08-20 RX ADMIN — Medication 10 ML: at 21:42

## 2022-08-20 RX ADMIN — HYDROMORPHONE HYDROCHLORIDE 1 MG: 1 INJECTION, SOLUTION INTRAMUSCULAR; INTRAVENOUS; SUBCUTANEOUS at 04:20

## 2022-08-20 ASSESSMENT — PAIN DESCRIPTION - LOCATION
LOCATION: ABDOMEN

## 2022-08-20 ASSESSMENT — PAIN SCALES - GENERAL
PAINLEVEL_OUTOF10: 8
PAINLEVEL_OUTOF10: 3
PAINLEVEL_OUTOF10: 3
PAINLEVEL_OUTOF10: 4
PAINLEVEL_OUTOF10: 5
PAINLEVEL_OUTOF10: 9
PAINLEVEL_OUTOF10: 4

## 2022-08-20 ASSESSMENT — PAIN DESCRIPTION - ORIENTATION
ORIENTATION: MID

## 2022-08-20 ASSESSMENT — PAIN DESCRIPTION - DESCRIPTORS
DESCRIPTORS: ACHING
DESCRIPTORS: ACHING;DISCOMFORT

## 2022-08-20 NOTE — PROGRESS NOTES
Shift assessment complete. Meds given per eMAR. Ostomy draining well. Estrada care complete and draining well. Call light within reach. The care plan and education has been reviewed and mutually agreed upon with the patient.    Electronically signed by Hamzah Mcleod RN on 8/19/2022 at 10:56 PM

## 2022-08-20 NOTE — PLAN OF CARE
Problem: Discharge Planning  Goal: Discharge to home or other facility with appropriate resources  8/20/2022 0943 by Lewis Gorman RN  Outcome: Progressing     Problem: Safety - Adult  Goal: Free from fall injury  8/20/2022 0943 by Lewis Gorman RN  Outcome: Progressing     Problem: ABCDS Injury Assessment  Goal: Absence of physical injury  8/20/2022 0943 by Lewis Gorman RN  Outcome: Progressing     Problem: Pain  Goal: Verbalizes/displays adequate comfort level or baseline comfort level  8/20/2022 0943 by Lewis Gorman RN  Outcome: Progressing

## 2022-08-20 NOTE — PROGRESS NOTES
[facility] Laparoscopic [facility]        Progress Note    Patient Name: Reyna Arcos  MRN: [id number]  [place]: 1955  Date of Evaluation: 2022    Subjective:  No acute events overnight  Pain controlled  Enteric output from ostomy-about 750  Up and awake in bed, feels a little bloated    Post-Operative Day # 4      Vital Signs:  Patient Vitals for the past 24 hrs:   BP Temp Temp src Pulse Resp SpO2   22 1145 125/76 98.1 °F (36.7 °C) Oral 77 16 95 %   22 0837 134/76 98.5 °F (36.9 °C) Oral 78 16 95 %   22 0450 -- -- -- -- 14 --   22 0420 118/74 98.3 °F (36.8 °C) Oral 78 14 94 %   22 2315 133/82 98 °F (36.7 °C) Oral 82 16 95 %   22 2213 -- -- -- -- 16 --   22 2115 123/71 98.2 °F (36.8 °C) Oral 80 16 94 %   22 1927 -- -- -- -- 16 --   22 1857 -- -- -- -- 16 --   22 1347 -- -- -- -- 16 --        TEMPERATURE HISTORY 24H: Temp (24hrs), Av.2 °F (36.8 °C), Min:98 °F (36.7 °C), Max:98.5 °F (36.9 °C)    BLOOD PRESSURE HISTORY: Systolic (25YRJ), XZM:874 , Min:105 , PTM:849    Diastolic (09IFE), KMD:97, Min:64, Max:82      Intake/Output:  I/O last 3 completed shifts:  In: -   Out: 3400 [Urine:2450; Stool:950]  I/O this shift:  In: 120 [P.O.:120]  Out: 1100 [Urine:1100]  Drain/tube Output:       Physical Exam:  General: awake, alert, oriented to person, place, time  Lungs: unlabored respirations  Abdomen: firm,  mildly distended, incisional tenderness only, bowel sounds present, stoma pink/viable, small amount of enteric output in pouch  Skin/Wound: healing well, no drainage, no erythema, well approximated    Labs:  CBC:    Recent Labs     22  0529 22  0453   WBC 13.1* 14.9* 10.7   HGB 12.5* 12.8* 12.1*   HCT 38.3* 39.1* 35.7*    314 300       BMP:    Recent Labs     22  0529 22  0453    136 138   K 4.3 4.3 4.7   CL 98* 98* 98*   CO2 32 29 34*   BUN 8 6* 5* CREATININE 0.7* 0.7* 0.7*   GLUCOSE 151* 164* 146*       Hepatic:  No results for input(s): AST, ALT, ALB, BILITOT, ALKPHOS in the last 72 hours. Amylase:  No results found for: AMYLASE  Lipase:  No results found for: LIPASE   Mag:  No results found for: MG  Phos:   No results found for: PHOS   Coags: No results found for: PROTIME, INR, APTT    Cultures:  Anaerobic culture  No results found for: LABANAE  Fungus stain  No results found for requested labs within last 30 days. Gram stain  No results found for requested labs within last 30 days. Organism  No results found for: [facility]  Surgical culture  No results found for: CXSURG  Blood culture 1  No results found for requested labs within last 30 days. Blood culture 2  No results found for requested labs within last 30 days. Fecal occult  No results found for requested labs within last 30 days. GI bacterial pathogens by PCR  No results found for requested labs within last 30 days. C. difficile  No results found for requested labs within last 30 days. Urine culture  No results found for: LABURIN    Pathology:  OR 8/16/2022--FINAL DIAGNOSIS:     A. Appendix, appendectomy:   - No diagnostic abnormality. B.  Sigmoid colon, resection:   - Acute diverticulitis with abscess and sinus tract formation.   - Negative for malignancy. C.  Additional proximal segments and rings:   - No diagnostic abnormality. Imaging:  I have personally reviewed the following films:    XR ABDOMEN (2 VIEWS)    Result Date: 8/19/2022  EXAMINATION: TWO XRAY VIEWS OF THE ABDOMEN 8/19/2022 12:44 pm COMPARISON: None HISTORY: ORDERING SYSTEM PROVIDED HISTORY: Abdominal distention TECHNOLOGIST PROVIDED HISTORY: Reason for exam:->Abdominal distention Reason for Exam: Abdominal distention FINDINGS: No evidence of pneumoperitoneum. Diffuse dilated, air-filled loops of small bowel are seen throughout the abdomen. Paucity of air in the visualized colon.   No abnormal soft tissue mass or calcification. No significant skeletal finding. Dilated, air-filled loops of small bowel diffusely. Differential would include either ileus or a developing bowel obstruction. Scheduled Meds:   metoclopramide  10 mg IntraVENous Q6H    tamsulosin  0.4 mg Oral Daily    sodium chloride flush  5-40 mL IntraVENous 2 times per day    enoxaparin  40 mg SubCUTAneous Daily     Continuous Infusions:   dextrose 5% and 0.45% NaCl with KCl 20 mEq 100 mL/hr at 08/20/22 0424    sodium chloride       PRN Meds:.calcium carbonate, sodium chloride flush, sodium chloride, HYDROmorphone, ondansetron      Assessment:  OR Date 8/16/2022, robot-assist laparoscopic sigmoid colon resection with repair of colovesical fistula, ostomy creation, and appendectomy for colovesical fistula  Postoperative ileus      Plan:  1. Pain controlled, seems a little distended at this point still  2. Continue clear liquid diet, hold off on adv diet  3. IV hydration until PO intake is adequate; monitor and correct electrolytes  4. Activity as tolerated, ambulate TID, up to chair for all meals  5. Pulmonary toilet, incentive spirometry  6. PRN analgesics and antiemetics--minimizing narcotics as tolerated  7. DVT prophylaxis with  Lovenox and SCD's    EDUCATION:  Educated patient on plan of care and disease process--all questions answered.     Plans discussed with patient       Signed:  Jim Mack MD

## 2022-08-20 NOTE — CARE COORDINATION
Informed by dayshift nurse patient maybe discharged this weekend. Provided patient with ostomy supplies and [employer] supply catalog with suggest of ostomy products marked. Left with night charge nurse, Ángel Gaona.  WILLIAM DouglasN, RN, [facility], [facility]  [phone]

## 2022-08-20 NOTE — PROGRESS NOTES
Assessment complete. VSS, respirations are even and unlabored. Afebrile. Pt is resting. Call light within reach. Fall risk precaution in place, non-skid socks on, bed in lowest position and locked. No other needs expressed. will continue to monitor.

## 2022-08-21 PROCEDURE — 6370000000 HC RX 637 (ALT 250 FOR IP): Performed by: SURGERY

## 2022-08-21 PROCEDURE — 2500000003 HC RX 250 WO HCPCS: Performed by: SURGERY

## 2022-08-21 PROCEDURE — 6360000002 HC RX W HCPCS: Performed by: SURGERY

## 2022-08-21 PROCEDURE — 99024 POSTOP FOLLOW-UP VISIT: CPT | Performed by: SURGERY

## 2022-08-21 PROCEDURE — 2580000003 HC RX 258: Performed by: SURGERY

## 2022-08-21 PROCEDURE — 1200000000 HC SEMI PRIVATE

## 2022-08-21 RX ADMIN — POTASSIUM CHLORIDE, DEXTROSE MONOHYDRATE AND SODIUM CHLORIDE: 150; 5; 450 INJECTION, SOLUTION INTRAVENOUS at 06:22

## 2022-08-21 RX ADMIN — METOCLOPRAMIDE HYDROCHLORIDE 10 MG: 5 INJECTION INTRAMUSCULAR; INTRAVENOUS at 11:29

## 2022-08-21 RX ADMIN — Medication 10 ML: at 08:47

## 2022-08-21 RX ADMIN — METOCLOPRAMIDE HYDROCHLORIDE 10 MG: 5 INJECTION INTRAMUSCULAR; INTRAVENOUS at 00:08

## 2022-08-21 RX ADMIN — METOCLOPRAMIDE HYDROCHLORIDE 10 MG: 5 INJECTION INTRAMUSCULAR; INTRAVENOUS at 06:18

## 2022-08-21 RX ADMIN — TAMSULOSIN HYDROCHLORIDE 0.4 MG: 0.4 CAPSULE ORAL at 08:46

## 2022-08-21 RX ADMIN — HYDROMORPHONE HYDROCHLORIDE 1 MG: 1 INJECTION, SOLUTION INTRAMUSCULAR; INTRAVENOUS; SUBCUTANEOUS at 14:50

## 2022-08-21 RX ADMIN — METOCLOPRAMIDE HYDROCHLORIDE 10 MG: 5 INJECTION INTRAMUSCULAR; INTRAVENOUS at 23:53

## 2022-08-21 RX ADMIN — HYDROMORPHONE HYDROCHLORIDE 1 MG: 1 INJECTION, SOLUTION INTRAMUSCULAR; INTRAVENOUS; SUBCUTANEOUS at 20:39

## 2022-08-21 RX ADMIN — HYDROMORPHONE HYDROCHLORIDE 1 MG: 1 INJECTION, SOLUTION INTRAMUSCULAR; INTRAVENOUS; SUBCUTANEOUS at 06:18

## 2022-08-21 RX ADMIN — METOCLOPRAMIDE HYDROCHLORIDE 10 MG: 5 INJECTION INTRAMUSCULAR; INTRAVENOUS at 17:21

## 2022-08-21 RX ADMIN — ENOXAPARIN SODIUM 40 MG: 100 INJECTION SUBCUTANEOUS at 08:46

## 2022-08-21 ASSESSMENT — PAIN DESCRIPTION - DESCRIPTORS
DESCRIPTORS: DISCOMFORT
DESCRIPTORS: DISCOMFORT
DESCRIPTORS: ACHING
DESCRIPTORS: ACHING;CRAMPING

## 2022-08-21 ASSESSMENT — PAIN - FUNCTIONAL ASSESSMENT: PAIN_FUNCTIONAL_ASSESSMENT: PREVENTS OR INTERFERES SOME ACTIVE ACTIVITIES AND ADLS

## 2022-08-21 ASSESSMENT — PAIN DESCRIPTION - LOCATION
LOCATION: ABDOMEN

## 2022-08-21 ASSESSMENT — PAIN SCALES - GENERAL
PAINLEVEL_OUTOF10: 6
PAINLEVEL_OUTOF10: 8
PAINLEVEL_OUTOF10: 3
PAINLEVEL_OUTOF10: 8
PAINLEVEL_OUTOF10: 3
PAINLEVEL_OUTOF10: 7
PAINLEVEL_OUTOF10: 6

## 2022-08-21 ASSESSMENT — PAIN DESCRIPTION - ORIENTATION: ORIENTATION: RIGHT

## 2022-08-21 NOTE — PROGRESS NOTES
Shift assessment complete. Meds given per eMAR. Pt tolerated ambulation around fowler 2 times very well. Call light within reach.   Electronically signed by Suad Montez RN on 8/20/2022 at 10:50 PM

## 2022-08-21 NOTE — PROGRESS NOTES
AST, ALT, ALB, BILITOT, ALKPHOS in the last 72 hours. Amylase:  No results found for: AMYLASE  Lipase:  No results found for: LIPASE   Mag:  No results found for: MG  Phos:   No results found for: PHOS   Coags: No results found for: PROTIME, INR, APTT    Cultures:  Anaerobic culture  No results found for: LABANAE  Fungus stain  No results found for requested labs within last 30 days. Gram stain  No results found for requested labs within last 30 days. Organism  No results found for: [facility]  Surgical culture  No results found for: CXSURG  Blood culture 1  No results found for requested labs within last 30 days. Blood culture 2  No results found for requested labs within last 30 days. Fecal occult  No results found for requested labs within last 30 days. GI bacterial pathogens by PCR  No results found for requested labs within last 30 days. C. difficile  No results found for requested labs within last 30 days. Urine culture  No results found for: LABURIN    Pathology:  OR 8/16/2022--FINAL DIAGNOSIS:     A. Appendix, appendectomy:   - No diagnostic abnormality. B.  Sigmoid colon, resection:   - Acute diverticulitis with abscess and sinus tract formation.   - Negative for malignancy. C.  Additional proximal segments and rings:   - No diagnostic abnormality. Imaging:  I have personally reviewed the following films:    XR ABDOMEN (2 VIEWS)    Result Date: 8/19/2022  EXAMINATION: TWO XRAY VIEWS OF THE ABDOMEN 8/19/2022 12:44 pm COMPARISON: None HISTORY: ORDERING SYSTEM PROVIDED HISTORY: Abdominal distention TECHNOLOGIST PROVIDED HISTORY: Reason for exam:->Abdominal distention Reason for Exam: Abdominal distention FINDINGS: No evidence of pneumoperitoneum. Diffuse dilated, air-filled loops of small bowel are seen throughout the abdomen. Paucity of air in the visualized colon. No abnormal soft tissue mass or calcification. No significant skeletal finding.      Dilated, air-filled loops of small bowel diffusely. Differential would include either ileus or a developing bowel obstruction. Scheduled Meds:   metoclopramide  10 mg IntraVENous Q6H    tamsulosin  0.4 mg Oral Daily    sodium chloride flush  5-40 mL IntraVENous 2 times per day    enoxaparin  40 mg SubCUTAneous Daily     Continuous Infusions:   dextrose 5% and 0.45% NaCl with KCl 20 mEq 100 mL/hr at 08/21/22 6290    sodium chloride       PRN Meds:.calcium carbonate, sodium chloride flush, sodium chloride, HYDROmorphone, ondansetron      Assessment:  OR Date 8/16/2022, robot-assist laparoscopic sigmoid colon resection with repair of colovesical fistula, ostomy creation, and appendectomy for colovesical fistula  Postoperative ileus      Plan:  1. Pain controlled, a little less distended  2. Try to adv diet today  3. IV hydration until PO intake is adequate; monitor and correct electrolytes, decrease rate  4. Activity as tolerated, ambulate TID, up to chair for all meals  5. Pulmonary toilet, incentive spirometry  6. PRN analgesics and antiemetics--minimizing narcotics as tolerated  7. DVT prophylaxis with  Lovenox and SCD's    EDUCATION:  Educated patient on plan of care and disease process--all questions answered.     Plans discussed with patient       Signed:  Olga Espinosa MD

## 2022-08-21 NOTE — PLAN OF CARE
Problem: Discharge Planning  Goal: Discharge to home or other facility with appropriate resources  8/20/2022 2247 by See Rios RN  Outcome: Progressing  8/20/2022 0943 by Fozia Worthy RN  Outcome: Progressing     Problem: Safety - Adult  Goal: Free from fall injury  8/20/2022 2247 by See Rios RN  Outcome: Progressing  8/20/2022 0943 by Fozia Worthy RN  Outcome: Progressing     Problem: ABCDS Injury Assessment  Goal: Absence of physical injury  8/20/2022 2247 by See Rios RN  Outcome: Progressing  8/20/2022 0943 by Fozia Worthy RN  Outcome: Progressing     Problem: Pain  Goal: Verbalizes/displays adequate comfort level or baseline comfort level  8/20/2022 2247 by See Rios RN  Outcome: Progressing  8/20/2022 0943 by Fozia Worthy RN  Outcome: Progressing

## 2022-08-21 NOTE — PLAN OF CARE
Problem: Safety - Adult  Goal: Free from fall injury  8/21/2022 1139 by Nathanael Honeycutt RN  Outcome: Progressing  8/20/2022 2247 by Donny Davis RN  Outcome: Progressing     Problem: ABCDS Injury Assessment  Goal: Absence of physical injury  8/21/2022 1139 by Nathanael Honeycutt RN  Outcome: Progressing  8/20/2022 2247 by Donny Davis RN  Outcome: Progressing     Problem: Pain  Goal: Verbalizes/displays adequate comfort level or baseline comfort level  8/21/2022 1139 by Nathanael Honeycutt RN  Outcome: Progressing  8/20/2022 2247 by Donny Davis RN  Outcome: Progressing

## 2022-08-22 ENCOUNTER — APPOINTMENT (OUTPATIENT)
Dept: GENERAL RADIOLOGY | Age: 67
DRG: 330 | End: 2022-08-22
Attending: SURGERY
Payer: MEDICARE

## 2022-08-22 PROCEDURE — 97116 GAIT TRAINING THERAPY: CPT

## 2022-08-22 PROCEDURE — APPSS15 APP SPLIT SHARED TIME 0-15 MINUTES: Performed by: NURSE PRACTITIONER

## 2022-08-22 PROCEDURE — 74019 RADEX ABDOMEN 2 VIEWS: CPT

## 2022-08-22 PROCEDURE — 6370000000 HC RX 637 (ALT 250 FOR IP): Performed by: NURSE PRACTITIONER

## 2022-08-22 PROCEDURE — 6360000002 HC RX W HCPCS: Performed by: SURGERY

## 2022-08-22 PROCEDURE — 97165 OT EVAL LOW COMPLEX 30 MIN: CPT

## 2022-08-22 PROCEDURE — 97530 THERAPEUTIC ACTIVITIES: CPT

## 2022-08-22 PROCEDURE — 6370000000 HC RX 637 (ALT 250 FOR IP): Performed by: SURGERY

## 2022-08-22 PROCEDURE — APPNB30 APP NON BILLABLE TIME 0-30 MINS: Performed by: NURSE PRACTITIONER

## 2022-08-22 PROCEDURE — 99024 POSTOP FOLLOW-UP VISIT: CPT | Performed by: SURGERY

## 2022-08-22 PROCEDURE — 1200000000 HC SEMI PRIVATE

## 2022-08-22 PROCEDURE — 97161 PT EVAL LOW COMPLEX 20 MIN: CPT

## 2022-08-22 RX ORDER — ACETAMINOPHEN 500 MG
1000 TABLET ORAL EVERY 8 HOURS PRN
Status: DISCONTINUED | OUTPATIENT
Start: 2022-08-22 | End: 2022-08-25 | Stop reason: HOSPADM

## 2022-08-22 RX ORDER — HYDROCODONE BITARTRATE AND ACETAMINOPHEN 5; 325 MG/1; MG/1
1 TABLET ORAL EVERY 6 HOURS PRN
Status: DISCONTINUED | OUTPATIENT
Start: 2022-08-22 | End: 2022-08-25 | Stop reason: HOSPADM

## 2022-08-22 RX ADMIN — HYDROCODONE BITARTRATE AND ACETAMINOPHEN 1 TABLET: 5; 325 TABLET ORAL at 17:15

## 2022-08-22 RX ADMIN — METOCLOPRAMIDE HYDROCHLORIDE 10 MG: 5 INJECTION INTRAMUSCULAR; INTRAVENOUS at 17:13

## 2022-08-22 RX ADMIN — ENOXAPARIN SODIUM 40 MG: 100 INJECTION SUBCUTANEOUS at 08:49

## 2022-08-22 RX ADMIN — ANTACID TABLETS 500 MG: 500 TABLET, CHEWABLE ORAL at 12:30

## 2022-08-22 RX ADMIN — TAMSULOSIN HYDROCHLORIDE 0.4 MG: 0.4 CAPSULE ORAL at 08:48

## 2022-08-22 RX ADMIN — METOCLOPRAMIDE HYDROCHLORIDE 10 MG: 5 INJECTION INTRAMUSCULAR; INTRAVENOUS at 12:31

## 2022-08-22 RX ADMIN — HYDROMORPHONE HYDROCHLORIDE 1 MG: 1 INJECTION, SOLUTION INTRAMUSCULAR; INTRAVENOUS; SUBCUTANEOUS at 02:38

## 2022-08-22 RX ADMIN — ANTACID TABLETS 500 MG: 500 TABLET, CHEWABLE ORAL at 18:44

## 2022-08-22 RX ADMIN — METOCLOPRAMIDE HYDROCHLORIDE 10 MG: 5 INJECTION INTRAMUSCULAR; INTRAVENOUS at 05:50

## 2022-08-22 RX ADMIN — HYDROCODONE BITARTRATE AND ACETAMINOPHEN 1 TABLET: 5; 325 TABLET ORAL at 10:15

## 2022-08-22 ASSESSMENT — PAIN DESCRIPTION - ORIENTATION: ORIENTATION: LEFT

## 2022-08-22 ASSESSMENT — PAIN DESCRIPTION - DESCRIPTORS
DESCRIPTORS: CRAMPING
DESCRIPTORS: ACHING

## 2022-08-22 ASSESSMENT — PAIN DESCRIPTION - LOCATION
LOCATION: ABDOMEN
LOCATION: ABDOMEN

## 2022-08-22 ASSESSMENT — PAIN SCALES - GENERAL
PAINLEVEL_OUTOF10: 8
PAINLEVEL_OUTOF10: 7
PAINLEVEL_OUTOF10: 8

## 2022-08-22 NOTE — PROGRESS NOTES
[facility] [address]   Phone: [phone]    Occupational Therapy    [x] Initial Evaluation            [] Daily Treatment Note         [x] Discharge Summary      Patient: Khanh Escalante   : 1955   MRN: [id number]   Date of Service:  2022    Admitting Diagnosis:  Colovesical fistula  Current Admission Summary: s/p ROBOT ASSIST LAPAROSCOPIC SIGMOID COLON RESECTION WITH REPAIR OF COLOVESICAL FISTULA, APPENDECTOMY 2022  Past Medical History:  has no past medical history on file. Past Surgical History:  has a past surgical history that includes colectomy (N/A, 2022). Discharge Recommendations: Khanh Escalante scored a 24/24 on the -[facility] ADL Inpatient form. At this time, no further OT is recommended upon discharge due to no OT needs at this time. Recommend patient returns to prior setting with prior services. DME Required For Discharge: no DME required at discharge    Precautions/Restrictions: no restrictions  Weight Bearing Restrictions: no restrictions  [] Right Upper Extremity  [] Left Upper Extremity [] Right Lower Extremity  [] Left Lower Extremity     Required Braces/Orthotics: no braces required   [] Right  [] Left  Positional Restrictions:no positional restrictions    Pre-Admission Information   Lives With: spouse    Type of Home: house  Home Layout: one level  Home Access:  2 step to enter without rails , . Comment: 6in steps  Bathroom Layout: tub/shower unit  Bathroom Equipment: . Comment: no equipment  Toilet Height: standard height  Home Equipment: single point cane  Transfer Assistance: Independent without use of device  Ambulation Assistance:Independent without use of device  ADL Assistance: independent with all ADL's  IADL Assistance: independent with homemaking tasks, though wife completes  Active :        [x] Yes  [] No  Hand Dominance: [x] Left  [x] Right  Current Employment: retired.   Occupation:   Hobbies: Recent Falls: denies recent falls    Examination   Vision:   Vision Corrective Device: wears glasses for reading  Hearing:   WFL    Posture:   WFL    Tone:   Normotonic      ROM:   (B) UE AROM WFL  Strength:   (B) UE strength grossly WFL    Decision Making: low complexity  Clinical Presentation: stable      Subjective  General: Patient seated in chair on arrival, agreeable to OT/PT evaluation. Wife present. Pt reports he has been ambulating in hallway without assist.   Pain: 7/10. Location: abdomen  Pain Interventions: pain medication in place prior to arrival        Activities of Daily Living  Basic Activities of Daily Living  Grooming: Independent  Grooming Comments: standing at sink to use shower cap and comb hair  Dressing Comments: adjusts socks with figure 4 tech Mod I  Instrumental Activities of Daily Living  No IADL completed on this date. Functional Mobility  Bed Mobility  Bed mobility not completed on this date. Comments:  Transfers  Sit to stand transfer:modified independent  Stand to sit transfer: modified independent  Comments: chair no device  Functional Mobility:  Standing Balance: modified independent, . Comment intermittent UE support on sink with grooming.     Functional Mobility: .  modified independent  Functional Mobility Activity: 300ft  Functional Mobility Device Use: no device  Functional Mobility Comment: ambulation no device, slow pace but no LOB or unsteadiness noted  Pt managed 2 steps (see PT note for clarification)    Other Therapeutic Interventions    Functional Outcomes  AM-PAC Inpatient Daily Activity Raw Score: 24    Cognition  WFL  Orientation:    alert and oriented x 4  Command Following:   [facility]     Education  Barriers To Learning: none  Patient Education: patient educated on OT role and benefits, plan of care  Learning Assessment:  patient verbalizes and demonstrates understanding    Assessment  Activity Tolerance: good  Impairments Requiring Therapeutic Intervention: none - eval with same day discharge  Prognosis: good  Clinical Assessment: Patient s/p colon resection, pt presents at baseline. Educated pt on adaptive technique for LB dressing and demo good teach back. No OT needs at this time,will discharge from acute OT services  Safety Interventions: patient left in chair, call light within reach, gait belt, and family/caregiver present    Plan  Frequency: Eval with same day discharge. No follow up required. Current Treatment Recommendations: not applicable, evaluation completed with same day discharge. Goals  Patient Goals: to return home   Short Term Goals:  Time Frame: n/a  Patient eval with same day discharge. No goals set as patient is at baseline status.     Therapy Session Time     Individual Group Co-treatment   Time In    0904   Time Out    1115   Minutes    23        Timed Code Treatment Minutes:   8  Total Treatment Minutes:  23       Electronically Signed By: Verónica Lowe OT

## 2022-08-22 NOTE — PROGRESS NOTES
[facility] and Laparoscopic Surgery        Progress Note    Patient Name: Tahmina Pepper  MRN: [id number]  YOB: 1955  Date of Evaluation: 2022    Subjective:  No acute events overnight  Pain controlled  Mild nausea and dyspepsia this morning following intake of coffee, poor appetite, no vomiting  Enteric output from ostomy  Up to chair at this time    Post-Operative Day #6      Vital Signs:  Patient Vitals for the past 24 hrs:   BP Temp Temp src Pulse Resp SpO2   22 1242 128/79 98.2 °F (36.8 °C) Oral 73 16 94 %   22 1015 -- -- -- -- 16 --   22 0845 122/79 98 °F (36.7 °C) Oral 71 16 93 %   22 0238 -- -- -- -- 16 --   22 2039 125/74 98.3 °F (36.8 °C) Oral 72 18 93 %   22 1600 127/76 98.3 °F (36.8 °C) Oral 78 18 --   22 1520 -- -- -- -- 16 --        TEMPERATURE HISTORY 24H: Temp (24hrs), Av.2 °F (36.8 °C), Min:98 °F (36.7 °C), Max:98.3 °F (36.8 °C)    BLOOD PRESSURE HISTORY: Systolic (76FVD), ENQ:126 , Min:118 , UMJ:123    Diastolic (83USK), DSW:93, Min:70, Max:79      Intake/Output:  I/O last 3 completed shifts: In: 65 [P.O.:660]  Out: 3080 [Urine:2480; Stool:600]  I/O this shift: In: 5 [P.O.:540]  Out: 125 [Stool:125]  Drain/tube Output:       Physical Exam:  General: awake, alert, oriented to person, place, time  Lungs: unlabored respirations  Abdomen: soft, mildly distended, incisional tenderness only, bowel sounds present, stoma pink/viable, enteric output in pouch  Skin/Wound: healing well, no drainage, no erythema, well approximated    Labs:  CBC:    Recent Labs     22  0453   WBC 10.7   HGB 12.1*   HCT 35.7*          BMP:    Recent Labs     22  0453      K 4.7   CL 98*   CO2 34*   BUN 5*   CREATININE 0.7*   GLUCOSE 146*       Hepatic:  No results for input(s): AST, ALT, ALB, BILITOT, ALKPHOS in the last 72 hours.   Amylase:  No results found for: AMYLASE  Lipase:  No results found for: LIPASE   Mag: No results found for: MG  Phos:   No results found for: PHOS   Coags: No results found for: PROTIME, INR, APTT    Cultures:  Anaerobic culture  No results found for: LABANAE  Fungus stain  No results found for requested labs within last 30 days. Gram stain  No results found for requested labs within last 30 days. Organism  No results found for: [facility]  Surgical culture  No results found for: CXSURG  Blood culture 1  No results found for requested labs within last 30 days. Blood culture 2  No results found for requested labs within last 30 days. Fecal occult  No results found for requested labs within last 30 days. GI bacterial pathogens by PCR  No results found for requested labs within last 30 days. C. difficile  No results found for requested labs within last 30 days. Urine culture  No results found for: LABURIN    Pathology:  OR 8/16/2022--FINAL DIAGNOSIS:     A. Appendix, appendectomy:   - No diagnostic abnormality. B.  Sigmoid colon, resection:   - Acute diverticulitis with abscess and sinus tract formation.   - Negative for malignancy. C.  Additional proximal segments and rings:   - No diagnostic abnormality. Imaging:  I have personally reviewed the following films:    XR ABDOMEN (2 VIEWS)    Result Date: 8/22/2022  EXAMINATION: TWO XRAY VIEWS OF THE ABDOMEN 8/22/2022 12:46 pm COMPARISON: 08/19/2022. HISTORY: ORDERING SYSTEM PROVIDED HISTORY: Follow up ileus/abdominal distention TECHNOLOGIST PROVIDED HISTORY: Reason for exam:->Follow up ileus/abdominal distention Reason for Exam: Follow up ileus/abdominal distention FINDINGS: Dilated small bowel loops measure up to 4 cm which is similar to the prior study. There are multiple air-fluid levels. There is no free air. There is a normal amount of stool in the colon. There is subcutaneous emphysema along the right abdominal wall. No free peritoneal air is noted.      1. No significant change in the small bowel dilation with multiple air-fluid levels suggesting ileus or some degree of small bowel obstruction. 2. Subcutaneous emphysema along the right lateral abdominal wall. Scheduled Meds:   metoclopramide  10 mg IntraVENous Q6H    tamsulosin  0.4 mg Oral Daily    sodium chloride flush  5-40 mL IntraVENous 2 times per day    enoxaparin  40 mg SubCUTAneous Daily     Continuous Infusions:   dextrose 5% and 0.45% NaCl with KCl 20 mEq 50 mL/hr at 08/21/22 1454    sodium chloride       PRN Meds:. HYDROcodone 5 mg - acetaminophen, acetaminophen, calcium carbonate, sodium chloride flush, sodium chloride, ondansetron      Assessment:  OR Date 8/16/2022, robot-assist laparoscopic sigmoid colon resection with repair of colovesical fistula, ostomy creation, and appendectomy for colovesical fistula  Postoperative ileus      Plan:  1. Pain controlled, mild nausea without vomiting and dyspepsia this morning, continued stool output from ostomy, only mild distention on exam, vitals stable, AXR reviewed; continued supportive care, continue Reglan, Estrada out tomorrow  2. Regular diet as tolerated; monitor stoma output, education per WOC-RN  3. IV hydration until PO intake is adequate; monitor and correct electrolytes  4. Activity as tolerated, ambulate TID, up to chair for all meals  5. Pulmonary toilet, incentive spirometry  6. PRN analgesics and antiemetics--minimizing narcotics as tolerated  7. DVT prophylaxis with  Lovenox and SCD's  8. Disposition: Anticipate discharge home in the next 24-48 hours    EDUCATION:  Educated patient on plan of care and disease process--all questions answered. Plans discussed with patient and nursing. Reviewed and discussed with Dr. Sachin Mcdermott.       Signed:  Carmell Apley, APRN - JEAN  8/22/2022 2:12 PM    Slowly improving  Still has some abdominal distention  Ileostomy functioning  P.o. intake fair  Working on learning ileostomy care  Continue full liquid  Check abdominal x-rays today

## 2022-08-22 NOTE — PROGRESS NOTES
Ostomy Referral Follow-up Progress Note      NAME:  Jose Lewis RECORD NUMBER:  [id number]  AGE: 74 y.o. GENDER:  male  :  1955  TODAY'S DATE:  2022    Subjective: Carol Valdovinos is a 74 y.o. male referred by:   [x] Physician  [] Nursing  [] Other:     New    Objective    /79   Pulse 73   Temp 98.2 °F (36.8 °C) (Oral)   Resp 16   Ht 6' (1.829 m)   Wt 200 lb 3.2 oz (90.8 kg)   SpO2 94%   BMI 27.15 kg/m²     Jayro Risk Score Jayro Scale Score: 20    Assessment   Surgeon Orly Griffin    Ileostomy       loop ileostomy stoma with rubber bridge, 40 mm high x 48 mm wide       Colostomy RUQ Loop (Active)   Stomal Appliance 2 piece; Changed 22 133   Flange Size (inches) 2.25 Inches 22 1339   Stoma  Assessment Red;Moist;Protrudes 22 1339   Peristomal Assessment Clean, dry & intact 22 1339   Treatment Bag change; Heat applied;Site care 22 1339   Stool Appearance Loose; Watery 22 1339   Stool Color Green;Brown 22 1339   Stool Amount Medium 22 1339   Output (mL) 125 ml 22 1339   Number of days: 5         Intake/Output Summary (Last 24 hours) at 2022 1345  Last data filed at 2022 1339  Gross per 24 hour   Intake 120 ml   Output 2075 ml   Net - ml       Plan:   Plan for Ostomy Care:    ILEOSTOMY CARE   Empty pouch when it is 1/3 to 1/2 full of gas or/and stool, this will be approximately 5 to 8 times daily. Change ileostomy dressing every 3 to 4 days. Change ileostomy dressing whenever it leaks to prevent skin damage. Measure stoma with each dressing change (40 mm high x 48 mm wide on 22). ILEOSTOMY DRESSING CHANGE   1. Gather all supplies for dressing change; scissors, coloplast [id number] & [id number].   2. Empty pouch. 3. Remove old dressing. 4. Clean stoma and surrounding skin with warm water only- no soap or bath wipes.    5. Dry stoma and surrounding skin & leave dry cloth on stoma to contain any new stool.   6. Measure stoma (40 mm high x 48 mm wide on 8-22-22). 7. Cut new dressing to fit stoma size. 8. Warm new dressing for 30 seconds by rubbing between hands or placing in heating pad. 9. Re-clean & dry stoma & surrounding skin, if needed. 10. Apply new dressing with dressing tab facing upper abdomen. (Save back of dressing for template for next stoma measurement.)   11. Line white adhesive of drainage pouch up with blue Red Devil on dressing Landing Pad, apply & check for secure attachment with fingers like checking ziplock bags or tupperware. 12. Place heating pad over new dressing on abdomen for 3 to 5 minutes to promote adherence. Ostomy Plan of Care  [x] Supplies/Instructions left in room  [] Patient using home supplies  [x] Brand/supplies at bedside; coloplast #[id number] & [id number]    Current Diet: ADULT DIET; Regular  Dietician consult:  No    Discharge Plan:  Placement for patient upon discharge: home with support    Outpatient visit plan No  Supplies given Yes   Samples requested Yes    Referrals:  [x]   [x] [facility]  [x] Supplies  [] Other      Patient/Caregiver Teaching:  Written Instructions given to patient/family  Teaching provided:  [x] Reviewed GI and A&P        [x] Supplies  [x] Pouch emptying      [x] Manipulate closure  [x] Routine Care         [x] Comment  [x] Pouch maintenance    Wife @ bedside, has not been present for any education before today. Demonstrated & explained pouch emptying and ileostomy dressing change.  the patient & wife listened & watched, both audibly gagged when old dressing removed & stated \"not sure if I can do that\"           Level of patient/caregiver understanding able to:  [] Indicates understanding       [x] Needs reinforcement  [] Unsuccessful      [] Verbal Understanding  [] Demonstrated understanding       [] No evidence of learning  [] Refused teaching         [] N/A    Electronically signed by Neo Weaver, RN, BSN, OMS, SHOSHANAES on 8/22/2022 at 1:45 PM

## 2022-08-22 NOTE — PROGRESS NOTES
Physical [facility]   Phone: [phone]    Physical Therapy    [x] Initial Evaluation            [] Daily Treatment Note         [x] Discharge Summary      Patient: Terri Palm   : 1955   MRN: [id number]   Date of Service:  2022  Admitting Diagnosis: Colovesical fistula  Current Admission Summary: 74year old male with a colovesical fistula. Patient had robot assist laparoscopic sigmoid colon resection with repair of colovesical fistula, appendectomy on 2022. Past Medical History:  has no past medical history on file. Past Surgical History:  has a past surgical history that includes colectomy (N/A, 2022). Discharge Recommendations: Terri Palm scored a 24/ on the AM-PAC short mobility form. At this time, no further PT is recommended upon discharge due to patient returning to baseline function with mobility requiring no PT needs at this time. Recommend patient returns to prior setting with prior services. DME Required For Discharge: no DME required at discharge  Precautions/Restrictions: no restrictions  Weight Bearing Restrictions: no restrictions  [] Right Upper Extremity  [] Left Upper Extremity [] Right Lower Extremity  [] Left Lower Extremity     Required Braces/Orthotics: no braces required   [] Right  [] Left  Positional Restrictions:no positional restrictions    Pre-Admission Information   Lives With: spouse                  Type of Home: house  Home Layout: one level  Home Access:  2 step to enter without rails , . Comment: 6in steps  Bathroom Layout: tub/shower unit  Bathroom Equipment: .   Comment: no equipment  Toilet Height: standard height  Home Equipment: single point cane  Transfer Assistance: Independent without use of device  Ambulation Assistance:Independent without use of device  ADL Assistance: independent with all ADL's  IADL Assistance: independent with homemaking tasks, though wife completes  Active :        [x] Yes                 [] No  Hand Dominance: [x] Left                 [x] Right  Current Employment: retired. Occupation:   Hobbies:   Recent Falls: denies recent falls    Examination   Vision:   Vision Gross Assessment: WFL and Vision Corrective Device: wears glasses for reading  Hearing:   WFL  Sensation:   WFL  Proprioception:    WFL  Tone:   Normotonic  ROM:   (B) LE AROM WFL  Strength:   (B) LE strength grossly WFL  Decision Making: low complexity  Clinical Presentation: stable      Subjective  General: Patient sitting in recliner chair upon arrival. Patient is agreeable to PT/OT. Pain: 7/10. Location: abdomen  Pain Interventions: not applicable       Functional Mobility  Bed Mobility  Bed mobility not completed on this date. Comments:  Transfers  Sit to stand transfer: Independent  Stand to sit transfer: Independent  Comments:  Ambulation  Surface:level surface  Assistive Device: no device  Assistance: modified independent  Distance: 300 ft  Gait Mechanics: decreased adan  Comments:  Patient required assistance with IV pole during ambulation  Stair Mobility  Number of Steps: 2  Step Height: 4 inch  Hand Rails: None  Assistance: modified independent  Comments: Patient required assistance with IV pole  Wheelchair Mobility:  No w/c mobility completed on this date.   Comments:  Balance  Static Sitting Balance: good(+): independent with high level dynamic balance in unsupported position  Dynamic Sitting Balance: good(+): independent with high level dynamic balance in unsupported position  Static Standing Balance: good: independent with functional balance in unsupported position  Dynamic Standing Balance: good: independent with functional balance in unsupported position  Comments:    Other Therapeutic Interventions  See OT note for ADLs  Functional Outcomes  AM-PAC Inpatient Mobility Raw Score : 24              Cognition  WFL  Orientation:    alert and oriented x 4  Command Following:   [facility]    Education  Barriers To Learning: none  Patient Education: patient educated on PT role and benefits, plan of care, HEP, family education, discharge recommendations  Learning Assessment:  patient verbalizes and demonstrates understanding    Assessment  Activity Tolerance: Patient demonstrates good tolerance as demonstrated by ambulating 300 ft w/ AD  Impairments Requiring Therapeutic Intervention: decreased strength, decreased endurance, increased pain  Prognosis: good  Clinical Assessment: Patient presents s/p colon resection surgery on 8/16/2022. Patient demonstrates at baseline function with functional mobility and transfers. Patient is safe to return home at this time. Safety Interventions: patient left in chair, call light within reach, gait belt, and family/caregiver present    Plan  Frequency: Eval with same day discharge. No follow up required. Current Treatment Recommendations: not applicable, evaluation completed with same day discharge. Goals  Patient Goals: Return home   Short Term Goals:    Patient eval with same day discharge. No goals set as patient is at baseline mobility status.       Therapy Session Time      Individual Group Co-treatment   Time In     4237   Time Out     1115   Minutes     23     Timed Code Treatment Minutes:  8 Minutes  Total Treatment Minutes:  23 Minutes       Electronically Signed By: Korina Lilly, PT  Korina Lilly PT, DPT, [id number]

## 2022-08-22 NOTE — PROGRESS NOTES
Shift assessment complete see flow sheets meds per orders see eMAR. Patient alert and oriented x4, reports no nausea. The care plan and education has been reviewed and mutually agreed upon with the patient. Patient remains free from falls. All fall precautions in place. Yellow blanket at bedside, yellow bracelet on patient. SAFE sign on door. Bed and chair alarms being used. Bed in lowest position. Will monitor.      Electronically signed by Valencia Manley RN on 8/22/2022 at 1:18 AM

## 2022-08-23 ENCOUNTER — APPOINTMENT (OUTPATIENT)
Dept: CT IMAGING | Age: 67
DRG: 330 | End: 2022-08-23
Attending: SURGERY
Payer: MEDICARE

## 2022-08-23 LAB
ANION GAP SERPL CALCULATED.3IONS-SCNC: 9 MMOL/L (ref 3–16)
BUN BLDV-MCNC: 6 MG/DL (ref 7–20)
CALCIUM SERPL-MCNC: 9.6 MG/DL (ref 8.3–10.6)
CHLORIDE BLD-SCNC: 97 MMOL/L (ref 99–110)
CO2: 30 MMOL/L (ref 21–32)
CREAT SERPL-MCNC: 0.6 MG/DL (ref 0.8–1.3)
GFR AFRICAN AMERICAN: >60
GFR NON-AFRICAN AMERICAN: >60
GLUCOSE BLD-MCNC: 139 MG/DL (ref 70–99)
HCT VFR BLD CALC: 41.5 % (ref 40.5–52.5)
HEMOGLOBIN: 13.9 G/DL (ref 13.5–17.5)
MCH RBC QN AUTO: 28.7 PG (ref 26–34)
MCHC RBC AUTO-ENTMCNC: 33.6 G/DL (ref 31–36)
MCV RBC AUTO: 85.6 FL (ref 80–100)
PDW BLD-RTO: 13.5 % (ref 12.4–15.4)
PLATELET # BLD: 391 K/UL (ref 135–450)
PMV BLD AUTO: 6.8 FL (ref 5–10.5)
POTASSIUM SERPL-SCNC: 4.3 MMOL/L (ref 3.5–5.1)
RBC # BLD: 4.85 M/UL (ref 4.2–5.9)
SODIUM BLD-SCNC: 136 MMOL/L (ref 136–145)
WBC # BLD: 8.4 K/UL (ref 4–11)

## 2022-08-23 PROCEDURE — 36415 COLL VENOUS BLD VENIPUNCTURE: CPT

## 2022-08-23 PROCEDURE — 2580000003 HC RX 258: Performed by: SURGERY

## 2022-08-23 PROCEDURE — 6370000000 HC RX 637 (ALT 250 FOR IP): Performed by: SURGERY

## 2022-08-23 PROCEDURE — 1200000000 HC SEMI PRIVATE

## 2022-08-23 PROCEDURE — 85027 COMPLETE CBC AUTOMATED: CPT

## 2022-08-23 PROCEDURE — APPNB30 APP NON BILLABLE TIME 0-30 MINS: Performed by: NURSE PRACTITIONER

## 2022-08-23 PROCEDURE — APPSS15 APP SPLIT SHARED TIME 0-15 MINUTES: Performed by: NURSE PRACTITIONER

## 2022-08-23 PROCEDURE — 6360000004 HC RX CONTRAST MEDICATION: Performed by: NURSE PRACTITIONER

## 2022-08-23 PROCEDURE — 6370000000 HC RX 637 (ALT 250 FOR IP): Performed by: NURSE PRACTITIONER

## 2022-08-23 PROCEDURE — 74177 CT ABD & PELVIS W/CONTRAST: CPT

## 2022-08-23 PROCEDURE — 6360000002 HC RX W HCPCS: Performed by: SURGERY

## 2022-08-23 PROCEDURE — 6360000004 HC RX CONTRAST MEDICATION

## 2022-08-23 PROCEDURE — 2500000003 HC RX 250 WO HCPCS: Performed by: SURGERY

## 2022-08-23 PROCEDURE — 80048 BASIC METABOLIC PNL TOTAL CA: CPT

## 2022-08-23 RX ORDER — DIAPER,BRIEF,INFANT-TODD,DISP
EACH MISCELLANEOUS 2 TIMES DAILY
Status: DISCONTINUED | OUTPATIENT
Start: 2022-08-23 | End: 2022-08-25 | Stop reason: HOSPADM

## 2022-08-23 RX ORDER — DIAPER,BRIEF,INFANT-TODD,DISP
EACH MISCELLANEOUS 2 TIMES DAILY
Status: DISCONTINUED | OUTPATIENT
Start: 2022-08-23 | End: 2022-08-23 | Stop reason: RX

## 2022-08-23 RX ORDER — AMPICILLIN 2 G/1
INJECTION, POWDER, FOR SOLUTION INTRAVENOUS
Status: DISCONTINUED
Start: 2022-08-23 | End: 2022-08-23 | Stop reason: WASHOUT

## 2022-08-23 RX ORDER — PROMETHAZINE HYDROCHLORIDE 25 MG/ML
12.5 INJECTION, SOLUTION INTRAMUSCULAR; INTRAVENOUS EVERY 6 HOURS PRN
Status: DISCONTINUED | OUTPATIENT
Start: 2022-08-23 | End: 2022-08-25 | Stop reason: HOSPADM

## 2022-08-23 RX ORDER — DIPHENHYDRAMINE HYDROCHLORIDE 50 MG/ML
25 INJECTION INTRAMUSCULAR; INTRAVENOUS EVERY 6 HOURS PRN
Status: DISCONTINUED | OUTPATIENT
Start: 2022-08-23 | End: 2022-08-25 | Stop reason: HOSPADM

## 2022-08-23 RX ADMIN — METOCLOPRAMIDE HYDROCHLORIDE 10 MG: 5 INJECTION INTRAMUSCULAR; INTRAVENOUS at 11:02

## 2022-08-23 RX ADMIN — ONDANSETRON 4 MG: 2 INJECTION INTRAMUSCULAR; INTRAVENOUS at 11:02

## 2022-08-23 RX ADMIN — METOCLOPRAMIDE HYDROCHLORIDE 10 MG: 5 INJECTION INTRAMUSCULAR; INTRAVENOUS at 05:41

## 2022-08-23 RX ADMIN — TAMSULOSIN HYDROCHLORIDE 0.4 MG: 0.4 CAPSULE ORAL at 08:27

## 2022-08-23 RX ADMIN — IOPAMIDOL 75 ML: 755 INJECTION, SOLUTION INTRAVENOUS at 13:53

## 2022-08-23 RX ADMIN — METOCLOPRAMIDE HYDROCHLORIDE 10 MG: 5 INJECTION INTRAMUSCULAR; INTRAVENOUS at 17:13

## 2022-08-23 RX ADMIN — ONDANSETRON 4 MG: 2 INJECTION INTRAMUSCULAR; INTRAVENOUS at 02:15

## 2022-08-23 RX ADMIN — ANTACID TABLETS 500 MG: 500 TABLET, CHEWABLE ORAL at 02:14

## 2022-08-23 RX ADMIN — ENOXAPARIN SODIUM 40 MG: 100 INJECTION SUBCUTANEOUS at 08:27

## 2022-08-23 RX ADMIN — POTASSIUM CHLORIDE, DEXTROSE MONOHYDRATE AND SODIUM CHLORIDE: 150; 5; 450 INJECTION, SOLUTION INTRAVENOUS at 02:17

## 2022-08-23 RX ADMIN — Medication 10 ML: at 08:27

## 2022-08-23 RX ADMIN — METOCLOPRAMIDE HYDROCHLORIDE 10 MG: 5 INJECTION INTRAMUSCULAR; INTRAVENOUS at 02:13

## 2022-08-23 RX ADMIN — DIATRIZOATE MEGLUMINE AND DIATRIZOATE SODIUM 120 ML: 660; 100 LIQUID ORAL; RECTAL at 11:02

## 2022-08-23 ASSESSMENT — PAIN SCALES - GENERAL
PAINLEVEL_OUTOF10: 0
PAINLEVEL_OUTOF10: 0

## 2022-08-23 NOTE — PROGRESS NOTES
[facility] and Laparoscopic Surgery        Progress Note    Patient Name: Orestes Doll  MRN: [id number]  YOB: 1955  Date of Evaluation: 2022    Subjective:  No acute events overnight  Pain controlled  Continues to have nausea and reports a few episodes of vomiting, poor PO intake  Enteric output from ostomy  Reports pruritic rash on back  Up to chair at this time    Post-Operative Day #7      Vital Signs:  Patient Vitals for the past 24 hrs:   BP Temp Temp src Pulse Resp SpO2   22 0801 (!) 141/87 97.7 °F (36.5 °C) Oral 76 17 92 %   22 0500 128/74 98 °F (36.7 °C) Oral 68 18 96 %   22 2126 136/76 98.2 °F (36.8 °C) Oral 64 18 96 %   22 1715 -- -- -- -- 16 --   22 1637 (!) 143/83 98.2 °F (36.8 °C) Oral 71 16 95 %        TEMPERATURE HISTORY 24H: Temp (24hrs), Av °F (36.7 °C), Min:97.7 °F (36.5 °C), Max:98.2 °F (36.8 °C)    BLOOD PRESSURE HISTORY: Systolic (03ZXD), KJL:483 , Min:122 , DVR:230    Diastolic (41JHD), UUM:39, Min:74, Max:87      Intake/Output:  I/O last 3 completed shifts: In: 65 [P.O.:660]  Out: 8649 [Urine:2500; Stool:875]  I/O this shift:  In: 200 [P.O.:200]  Out: 600 [Urine:400; Stool:200]  Drain/tube Output:       Physical Exam:  General: awake, alert, oriented to person, place, time  Lungs: unlabored respirations  Abdomen: soft, mildly distended, incisional tenderness only, bowel sounds present, stoma pink/viable, enteric output in pouch  Skin/Wound: healing well, no drainage, no erythema, well approximated    Labs:  CBC:    Recent Labs     22  1057   WBC 8.4   HGB 13.9   HCT 41.5          BMP:    Recent Labs     22  1057      K 4.3   CL 97*   CO2 30   BUN 6*   CREATININE 0.6*   GLUCOSE 139*       Hepatic:  No results for input(s): AST, ALT, ALB, BILITOT, ALKPHOS in the last 72 hours.   Amylase:  No results found for: AMYLASE  Lipase:  No results found for: LIPASE   Mag:  No results found for: MG  Phos: No results found for: PHOS   Coags: No results found for: PROTIME, INR, APTT    Cultures:  Anaerobic culture  No results found for: LABANAE  Fungus stain  No results found for requested labs within last 30 days. Gram stain  No results found for requested labs within last 30 days. Organism  No results found for: [facility]  Surgical culture  No results found for: CXSURG  Blood culture 1  No results found for requested labs within last 30 days. Blood culture 2  No results found for requested labs within last 30 days. Fecal occult  No results found for requested labs within last 30 days. GI bacterial pathogens by PCR  No results found for requested labs within last 30 days. C. difficile  No results found for requested labs within last 30 days. Urine culture  No results found for: LABURIN    Pathology:  OR 8/16/2022--FINAL DIAGNOSIS:     A. Appendix, appendectomy:   - No diagnostic abnormality. B.  Sigmoid colon, resection:   - Acute diverticulitis with abscess and sinus tract formation.   - Negative for malignancy. C.  Additional proximal segments and rings:   - No diagnostic abnormality. Imaging:  I have personally reviewed the following films:    XR ABDOMEN (2 VIEWS)    Result Date: 8/22/2022  EXAMINATION: TWO XRAY VIEWS OF THE ABDOMEN 8/22/2022 12:46 pm COMPARISON: 08/19/2022. HISTORY: ORDERING SYSTEM PROVIDED HISTORY: Follow up ileus/abdominal distention TECHNOLOGIST PROVIDED HISTORY: Reason for exam:->Follow up ileus/abdominal distention Reason for Exam: Follow up ileus/abdominal distention FINDINGS: Dilated small bowel loops measure up to 4 cm which is similar to the prior study. There are multiple air-fluid levels. There is no free air. There is a normal amount of stool in the colon. There is subcutaneous emphysema along the right abdominal wall. No free peritoneal air is noted.      1. No significant change in the small bowel dilation with multiple air-fluid levels suggesting ileus or some degree of small bowel obstruction. 2. Subcutaneous emphysema along the right lateral abdominal wall. Scheduled Meds:   hydrocortisone   Topical BID    metoclopramide  10 mg IntraVENous Q6H    tamsulosin  0.4 mg Oral Daily    sodium chloride flush  5-40 mL IntraVENous 2 times per day    enoxaparin  40 mg SubCUTAneous Daily     Continuous Infusions:   dextrose 5% and 0.45% NaCl with KCl 20 mEq 50 mL/hr at 08/23/22 0217    sodium chloride       PRN Meds:.promethazine, diphenhydrAMINE, HYDROcodone 5 mg - acetaminophen, acetaminophen, calcium carbonate, sodium chloride flush, sodium chloride, ondansetron      Assessment:  OR Date 8/16/2022, robot-assist laparoscopic sigmoid colon resection with repair of colovesical fistula, ostomy creation, and appendectomy for colovesical fistula  Postoperative ileus      Plan:  1. Pain controlled, continued nausea--now with vomiting, poor PO intake, despite continued stool output from ostomy, only mild distention on exam, vitals stable; continued supportive care, continue Reglan, repeat labs, check CT abdomen/pelvis, likely remove Estrada catheter following CT  2. Decrease to NPO; monitor stoma output, education per [facility]  3. IV hydration until PO intake is adequate, may need TPN if unable to start diet soon; monitor and correct electrolytes  4. Activity as tolerated, ambulate TID--PT/OT evaluation and treatment  5. Pulmonary toilet, incentive spirometry  6. PRN analgesics and antiemetics--minimizing narcotics as tolerated  7. DVT prophylaxis with  Lovenox and SCD's  8. Disposition: Anticipate discharge home in the next 2-3 days    EDUCATION:  Educated patient on plan of care and disease process--all questions answered. Plans discussed with patient and nursing. Reviewed and discussed with Dr. Monica Mejía.       Signed:  CLEMENTNIA Gross - CNP  8/23/2022 1:35 PM    Postop day #7  Continues to complain of abdominal bloating  P.o. intake poor  We will check CAT scan of the abdomen and pelvis today

## 2022-08-23 NOTE — PLAN OF CARE
Problem: Safety - Adult  Goal: Free from fall injury  8/23/2022 1127 by Radha Ulrich RN  Outcome: Progressing  8/22/2022 2128 by Chantel Spann RN  Outcome: Progressing     Problem: ABCDS Injury Assessment  Goal: Absence of physical injury  8/23/2022 1127 by Radha Ulrich RN  Outcome: Progressing  8/22/2022 2128 by Chantel Spann RN  Outcome: Progressing     Problem: Pain  Goal: Verbalizes/displays adequate comfort level or baseline comfort level  8/22/2022 2128 by Chantel Spann RN  Outcome: Progressing

## 2022-08-23 NOTE — PROGRESS NOTES
the patient complains of nausea, the patient & wife state that the patient [place] been up sick all night & is not up to any ostomy dressing change this am\". Reviewed diet related ostomy education. Informed the patient & wife that the patient & wife should empty & change ileostomy dressing at least once prior to discharge home for the patient's safety at home. Informed the patient & wife that the patient will not be discharged prior to being medically stable but inability or unwillingness to empty pouch & change ileostomy dressing will not delay discharge. the patient & wife verbalized understanding. the patient's wife states she will be out of town for 4 days next week & that they have a friend who has had an ostomy in the past. Encouraged to reach out to friend to see if he would be willing to be emergency back up for ostomy issues while wife is out of town. the patient & wife verbalized understanding. Will return later today as able if ileostomy dressing change needed after bridge removed by Dr. Truddie Crigler. John Wilburn RN, [facility] will follow up for ostomy needs 8-24 & 8-25-22. Celeste Reed RN, BSN, [address] [place] [address], [facility].  [phone]

## 2022-08-23 NOTE — PROGRESS NOTES
Morning vital signs, assessment and medications given as prescribed. Patient complains of itching and rash on his back, also feeling bad as he has indigestion and an upset stomach. Rossylillie PENA at bedside to assess patient. New orders received for antiemetic IVP, catscan with oral contrast, and anti-itch medication as needed. Oral contrast mixed with water and given to patient with instructions of intact. Patient states verbal understanding. Vital signs are stable at this time. Call light is within reach, wife is at bedside. 1345-Patient unable to tolerate full intake of oral contrast. Per Rossy/NP, patient can still be scanned. Patient transported off unit to CT via wheelchair.  Vitals signs and patient condition stable at time of transport

## 2022-08-24 PROCEDURE — APPSS15 APP SPLIT SHARED TIME 0-15 MINUTES: Performed by: NURSE PRACTITIONER

## 2022-08-24 PROCEDURE — APPNB30 APP NON BILLABLE TIME 0-30 MINS: Performed by: NURSE PRACTITIONER

## 2022-08-24 PROCEDURE — 1200000000 HC SEMI PRIVATE

## 2022-08-24 PROCEDURE — 6360000002 HC RX W HCPCS: Performed by: SURGERY

## 2022-08-24 PROCEDURE — 2500000003 HC RX 250 WO HCPCS: Performed by: SURGERY

## 2022-08-24 PROCEDURE — 6370000000 HC RX 637 (ALT 250 FOR IP): Performed by: SURGERY

## 2022-08-24 RX ORDER — HYDROCODONE BITARTRATE AND ACETAMINOPHEN 5; 325 MG/1; MG/1
1 TABLET ORAL EVERY 6 HOURS PRN
Qty: 10 TABLET | Refills: 0 | Status: SHIPPED | OUTPATIENT
Start: 2022-08-24 | End: 2022-08-31

## 2022-08-24 RX ADMIN — METOCLOPRAMIDE HYDROCHLORIDE 10 MG: 5 INJECTION INTRAMUSCULAR; INTRAVENOUS at 17:16

## 2022-08-24 RX ADMIN — METOCLOPRAMIDE HYDROCHLORIDE 10 MG: 5 INJECTION INTRAMUSCULAR; INTRAVENOUS at 12:56

## 2022-08-24 RX ADMIN — TAMSULOSIN HYDROCHLORIDE 0.4 MG: 0.4 CAPSULE ORAL at 08:16

## 2022-08-24 RX ADMIN — METOCLOPRAMIDE HYDROCHLORIDE 10 MG: 5 INJECTION INTRAMUSCULAR; INTRAVENOUS at 06:15

## 2022-08-24 RX ADMIN — METOCLOPRAMIDE HYDROCHLORIDE 10 MG: 5 INJECTION INTRAMUSCULAR; INTRAVENOUS at 00:39

## 2022-08-24 RX ADMIN — POTASSIUM CHLORIDE, DEXTROSE MONOHYDRATE AND SODIUM CHLORIDE: 150; 5; 450 INJECTION, SOLUTION INTRAVENOUS at 00:42

## 2022-08-24 RX ADMIN — ENOXAPARIN SODIUM 40 MG: 100 INJECTION SUBCUTANEOUS at 08:16

## 2022-08-24 ASSESSMENT — PAIN SCALES - GENERAL: PAINLEVEL_OUTOF10: 0

## 2022-08-24 NOTE — PROGRESS NOTES
Comprehensive Nutrition Assessment    Type and Reason for Visit:  Initial    Nutrition Recommendations/Plan:   Diet modification to low fiber     Malnutrition Assessment:  Malnutrition Status:  No malnutrition (08/24/22 1229)        Nutrition Assessment:    LOS nutrition assessment. S/p sigmoid colon resection with repair of colovesical fistula, appendectomy, loop ileostomy on 8/16. Diet initially advanced to clear liquids then to regular. Pt experienced some n/v and diet returned to NPO. Advanced to regular last evening and today denies nausea at present. Tolerated breakfast this am and per home diet pt usually eats every 2 hours throughout day and even during night. Provided both verbal and written Ileostomy diet education, pt verbalized understanding.  lb, pt denies any recent wt changes. Nutrition Related Findings:    no edema. watery stool(8/24). labs reviewed. Wound Type: Surgical Incision       Current Nutrition Intake & Therapies:    Average Meal Intake: 1-25%  Average Supplements Intake: None Ordered  ADULT DIET; Regular  Additional Calorie Sources:  Dextrose 5% in .45% NaCl providing 204 kcal    Anthropometric Measures:  Height: 6' (182.9 cm)  Ideal Body Weight (IBW): 178 lbs (81 kg)    Admission Body Weight: 200 lb (90.7 kg)  Current Body Weight: 200 lb (90.7 kg), 112.4 % IBW.     Current BMI (kg/m2): 27.1  Usual Body Weight: 205 lb (93 kg) (7/ lbs MD visit)  % Weight Change (Calculated): -2.4                         Estimated Daily Nutrient Needs:  Energy (kcal/day): 1820 - 8826  Protein (g/day): 105 - 122  Fluid (ml/day): 5217 - 4565    Nutrition Diagnosis:   Inadequate oral intake related to altered GI function as evidenced by intake 0-25%, nausea, vomiting    Nutrition Interventions:   Food and/or Nutrient Delivery: Modify Current Diet  Nutrition Education/Counseling: Education completed (Ileostomy diet education)  Coordination of Nutrition Care: Continue to monitor while inpatient       Goals:     Goals: PO intake 50% or greater       Nutrition Monitoring and Evaluation:   Behavioral-Environmental Outcomes: None Identified  Food/Nutrient Intake Outcomes: Food and Nutrient Intake  Physical Signs/Symptoms Outcomes: Nausea or Vomiting, Weight    Discharge Planning:     Too soon to determine     Fabi SUN Crain, LD  Contact: [phone]

## 2022-08-24 NOTE — PROGRESS NOTES
AM assessments completed. VSS. Morning meds given, well tolerated. Alert and oriented x4. The care plan and education has been reviewed and mutually agreed upon with the patient.

## 2022-08-24 NOTE — CARE COORDINATION
Patient seen to continue ostomy education. Reviewed with patient the following:  Empty pouch 5 to 8 times a day  Change pouch every 3 to 5 days or if leaking  Home health set up to continue ostomy education for new ostomy. Informed patient that the home health agency will determine how many weeks he will have     Patient verbalized understanding. Informed patient we will change his ostomy tomorrow. Patient reported he is emptying his pouch. Gen surgery may remove bridge from stoma today. Will check back with patient later.  JEAN CARLOS Ellis, WILLIAMN, RN, [facility], [address]  [phone]

## 2022-08-24 NOTE — PROGRESS NOTES
Comprehensive Nutrition Assessment    Type and Reason for Visit:  Initial    Nutrition Recommendations/Plan:   Continue Regular Diet. Malnutrition Assessment:  Malnutrition Status:  No malnutrition (08/24/22 1229)        Nutrition Assessment:    LOS 7d. Initial. Pt admitted with Colovesical fistula. s/p sigmoid colon resection with repair of colovesical fistula, appendectomy, loop ileostomy(8/16). CBW-200# , no  weight changes prior to admit. Pt reports good appetite prior to surgery. , usually eats every 2 hours throughout day and even during night. Reports of N/V since surgery with 25% or less intake of regular diet. On Reglan. Denied nausea at present, getting ready to eat lunch durig visit. No problems tolerating bkfst this am. Ileostomy diet education provided. Nutrition Related Findings:    no edema. watery stool(8/24). labs reviewed. Wound Type: None       Current Nutrition Intake & Therapies:    Average Meal Intake: 1-25%  Average Supplements Intake: None Ordered  ADULT DIET; Regular    Anthropometric Measures:  Height: 6' (182.9 cm)  Ideal Body Weight (IBW): 178 lbs (81 kg)    Admission Body Weight: 200 lb (90.7 kg)  Current Body Weight: 200 lb (90.7 kg), 112.4 % IBW. Current BMI (kg/m2): 27.1  Usual Body Weight: 205 lb (93 kg) (7/ lbs MD visit)  % Weight Change (Calculated): -2.4              Nutrition Diagnosis:   No nutrition diagnosis at this time. Nutrition Interventions:   Food and/or Nutrient Delivery: Continue Current Diet  Nutrition Education/Counseling: Education completed (Ileostomy diet education)  Coordination of Nutrition Care: Continue to monitor while inpatient       Goals:     Goals: PO intake 50% or greater       Nutrition Monitoring and Evaluation:   Behavioral-Environmental Outcomes: None Identified  Food/Nutrient Intake Outcomes: Food and Nutrient Intake  Physical Signs/Symptoms Outcomes: Nausea or Vomiting, Weight    Discharge Planning:     Too soon to determine Paul Armando RD  Contact: [phone]

## 2022-08-24 NOTE — PROGRESS NOTES
[facility]ane [facility] and Laparoscopic Surgery        Progress Note    Patient Name: Terri Palm  MRN: [id number]  YOB: 1955  Date of Evaluation: 2022    Subjective:  No acute events overnight  Pain controlled  Nausea better today, no further vomiting, appetite/intake improving  Enteric output from ostomy, reports emptying independently  Rash on back improved  Up to chair at this time    Post-Operative Day #8      Vital Signs:  Patient Vitals for the past 24 hrs:   BP Temp Temp src Pulse Resp SpO2 Height   22 1209 -- -- -- -- -- -- 6' (1.829 m)   22 1157 125/73 97.6 °F (36.4 °C) Oral 71 17 96 % --   22 0800 114/69 97.9 °F (36.6 °C) Oral 73 16 93 % --   22 0029 127/75 98.2 °F (36.8 °C) Oral 66 16 93 % --   22 2000 (!) 141/85 98.4 °F (36.9 °C) Oral 78 -- 93 % --        TEMPERATURE HISTORY 24H: Temp (24hrs), Av °F (36.7 °C), Min:97.6 °F (36.4 °C), Max:98.4 °F (36.9 °C)    BLOOD PRESSURE HISTORY: Systolic (59ATZ), THS:748 , Min:114 , FLX:469    Diastolic (32OTG), ZM, Min:69, Max:87      Intake/Output:  I/O last 3 completed shifts: In: 200 [P.O.:200]  Out: 7047 [Urine:2375; Stool:1500]  I/O this shift:  In: -   Out: 300 [Urine:200; Stool:100]  Drain/tube Output:       Physical Exam:  General: awake, alert, oriented to person, place, time  Lungs: unlabored respirations  Abdomen: soft, non-distended, incisional tenderness only, bowel sounds present, stoma pink/viable, enteric output in pouch  Skin/Wound: healing well, no drainage, no erythema, well approximated    Labs:  CBC:    Recent Labs     22  1057   WBC 8.4   HGB 13.9   HCT 41.5          BMP:    Recent Labs     22  1057      K 4.3   CL 97*   CO2 30   BUN 6*   CREATININE 0.6*   GLUCOSE 139*       Hepatic:  No results for input(s): AST, ALT, ALB, BILITOT, ALKPHOS in the last 72 hours.   Amylase:  No results found for: AMYLASE  Lipase:  No results found for: LIPASE   Mag:  No results found for: MG  Phos:   No results found for: PHOS   Coags: No results found for: PROTIME, INR, APTT    Cultures:  Anaerobic culture  No results found for: LABANAE  Fungus stain  No results found for requested labs within last 30 days. Gram stain  No results found for requested labs within last 30 days. Organism  No results found for: [facility]  Surgical culture  No results found for: CXSURG  Blood culture 1  No results found for requested labs within last 30 days. Blood culture 2  No results found for requested labs within last 30 days. Fecal occult  No results found for requested labs within last 30 days. GI bacterial pathogens by PCR  No results found for requested labs within last 30 days. C. difficile  No results found for requested labs within last 30 days. Urine culture  No results found for: LABURIN    Pathology:  OR 8/16/2022--FINAL DIAGNOSIS:     A. Appendix, appendectomy:   - No diagnostic abnormality. B.  Sigmoid colon, resection:   - Acute diverticulitis with abscess and sinus tract formation.   - Negative for malignancy. C.  Additional proximal segments and rings:   - No diagnostic abnormality. Imaging:  I have personally reviewed the following films:    CT ABDOMEN PELVIS W IV CONTRAST Additional Contrast? Oral    Result Date: 8/23/2022  EXAMINATION: CT OF THE ABDOMEN AND PELVIS WITH CONTRAST 8/23/2022 1:54 pm TECHNIQUE: CT of the abdomen and pelvis was performed with the administration of intravenous contrast. Multiplanar reformatted images are provided for review. Automated exposure control, iterative reconstruction, and/or weight based adjustment of the mA/kV was utilized to reduce the radiation dose to as low as reasonably achievable. COMPARISON: None.  HISTORY: ORDERING SYSTEM PROVIDED HISTORY: Abdominal distention TECHNOLOGIST PROVIDED HISTORY: Reason for exam:->Abdominal distention Additional Contrast?->Oral Reason for Exam: Abdominal distention FINDINGS: Lower Chest: Trace right pleural effusion with adjacent atelectasis. Organs: Hepatic steatosis without intrahepatic biliary ductal dilation. Subcentimeter hepatic cysts. The gallbladder is unremarkable. The spleen, pancreas and adrenal glands are unremarkable. The kidneys enhance symmetrically without evidence of hydronephrosis. GI/Bowel: Stomach and duodenal sweep demonstrate no acute abnormality. Right lower quadrant ileostomy. Patient is status post sigmoid colon resection and anastomosis. No evidence of bowel obstruction. Pelvis: Estrada catheter within the bladder. No acute abnormality of the prostate. No pelvic lymphadenopathy. Peritoneum/Retroperitoneum: No evidence of ascites. Small amount of pneumoperitoneum in the pelvis, postsurgical in etiology. .  No evidence of lymphadenopathy. Aorta is normal in caliber. Atherosclerotic disease of the abdominal aorta. Bones/Soft Tissues: Right abdominal wall soft tissue subcutaneous emphysema. Age related degenerative changes of the visualized osseous structures without focal destructive lesion. Small amount of pelvic pneumoperitoneum. Right abdominal wall subcutaneous emphysema. These are likely postsurgical in etiology. No acute pathology in the abdomen and pelvis.       Scheduled Meds:   hydrocortisone   Topical BID    metoclopramide  10 mg IntraVENous Q6H    tamsulosin  0.4 mg Oral Daily    sodium chloride flush  5-40 mL IntraVENous 2 times per day    enoxaparin  40 mg SubCUTAneous Daily     Continuous Infusions:   dextrose 5% and 0.45% NaCl with KCl 20 mEq 50 mL/hr at 08/24/22 0042    sodium chloride       PRN Meds:.promethazine, diphenhydrAMINE, HYDROcodone 5 mg - acetaminophen, acetaminophen, calcium carbonate, sodium chloride flush, sodium chloride, ondansetron      Assessment:  OR Date 8/16/2022, robot-assist laparoscopic sigmoid colon resection with repair of colovesical fistula, ostomy creation, and appendectomy for colovesical fistula  Postoperative ileus      Plan:  1. Pain controlled, nausea improved along with appetite/PO intake, increased stool output from ostomy, non-distended on exam, vitals/labs stable; continued supportive care, continue Reglan, remove Estrada catheter, will remove stoma bar tomorrow  2. Regular diet as tolerated; monitor stoma output, education per WOC-RN  3. IV hydration until PO intake is adequate; monitor and correct electrolytes  4. Activity as tolerated, ambulate TID, up to chair for meals--PT/OT following  5. Pulmonary toilet, incentive spirometry  6. PRN analgesics and antiemetics--minimizing narcotics as tolerated, transition to PO  7. DVT prophylaxis with  Lovenox and SCD's  8. Disposition: Anticipate discharge home tomorrow    EDUCATION:  Educated patient on plan of care and disease process--all questions answered. Plans discussed with patient and nursing. Reviewed and discussed with Dr. Regino Garcia.       Signed:  CLEMENTINA Pelaez - CNP  8/24/2022 2:50 PM    Doing much better today  Continue general diet  Aiming for discharge home tomorrow

## 2022-08-25 VITALS
BODY MASS INDEX: 27.12 KG/M2 | SYSTOLIC BLOOD PRESSURE: 115 MMHG | HEIGHT: 72 IN | TEMPERATURE: 97.7 F | DIASTOLIC BLOOD PRESSURE: 59 MMHG | HEART RATE: 71 BPM | RESPIRATION RATE: 16 BRPM | OXYGEN SATURATION: 96 % | WEIGHT: 200.2 LBS

## 2022-08-25 PROCEDURE — 0D1B4Z4 BYPASS ILEUM TO CUTANEOUS, PERCUTANEOUS ENDOSCOPIC APPROACH: ICD-10-PCS | Performed by: SURGERY

## 2022-08-25 PROCEDURE — APPNB30 APP NON BILLABLE TIME 0-30 MINS: Performed by: NURSE PRACTITIONER

## 2022-08-25 PROCEDURE — 94760 N-INVAS EAR/PLS OXIMETRY 1: CPT

## 2022-08-25 PROCEDURE — 6370000000 HC RX 637 (ALT 250 FOR IP): Performed by: SURGERY

## 2022-08-25 PROCEDURE — 6360000002 HC RX W HCPCS: Performed by: SURGERY

## 2022-08-25 PROCEDURE — 8E0W4CZ ROBOTIC ASSISTED PROCEDURE OF TRUNK REGION, PERCUTANEOUS ENDOSCOPIC APPROACH: ICD-10-PCS | Performed by: SURGERY

## 2022-08-25 PROCEDURE — APPSS30 APP SPLIT SHARED TIME 16-30 MINUTES: Performed by: NURSE PRACTITIONER

## 2022-08-25 PROCEDURE — 0DTJ4ZZ RESECTION OF APPENDIX, PERCUTANEOUS ENDOSCOPIC APPROACH: ICD-10-PCS | Performed by: SURGERY

## 2022-08-25 PROCEDURE — 0DTN4ZZ RESECTION OF SIGMOID COLON, PERCUTANEOUS ENDOSCOPIC APPROACH: ICD-10-PCS | Performed by: SURGERY

## 2022-08-25 RX ADMIN — ENOXAPARIN SODIUM 40 MG: 100 INJECTION SUBCUTANEOUS at 08:51

## 2022-08-25 RX ADMIN — TAMSULOSIN HYDROCHLORIDE 0.4 MG: 0.4 CAPSULE ORAL at 08:51

## 2022-08-25 RX ADMIN — METOCLOPRAMIDE HYDROCHLORIDE 10 MG: 5 INJECTION INTRAMUSCULAR; INTRAVENOUS at 01:43

## 2022-08-25 RX ADMIN — METOCLOPRAMIDE HYDROCHLORIDE 10 MG: 5 INJECTION INTRAMUSCULAR; INTRAVENOUS at 06:18

## 2022-08-25 NOTE — CARE COORDINATION
Discharge Planning Note:    Talked with Liliam Hampton:    - [facility] has been informed of the patient's discharge order. All case management needs have been met.     DANNY Magdaleno RN    [facility]  Phone: [phone]

## 2022-08-25 NOTE — DISCHARGE SUMMARY
discharge, Willa Closs was tolerating a regular diet, had active bowel sounds, ambulating on his own accord and had adequate analgesia on oral pain medications, and had no signs of symptoms of complications. PHYSICAL EXAMINATION:  Discharge Vitals:  height is 6' (1.829 m) and weight is 200 lb 3.2 oz (90.8 kg). His oral temperature is 97.7 °F (36.5 °C). His blood pressure is 115/59 (abnormal) and his pulse is 71. His respiration is 16 and oxygen saturation is 95%. General appearance - alert, well appearing, and in no distress  Chest - clear to ausculation  Heart - normal rate and regular rhythm  Abdomen - soft, non-distended, incisional tenderness only, bowel sounds present  Neurological - motor and sensory grossly normal bilaterally  Musculoskeletal - full range of motion without pain  Extremities - peripheral pulses normal, no pedal edema, no clubbing or cyanosis  Incision: healing well, no drainage, no erythema, well approximated    LABS:  Recent Labs     08/23/22  1057   WBC 8.4   HGB 13.9   HCT 41.5         K 4.3   CL 97*   CO2 30   BUN 6*   CREATININE 0.6*       DISCHARGE INSTRUCTIONS:  1. Discharge medications:      Medication List        START taking these medications      HYDROcodone-acetaminophen 5-325 MG per tablet  Commonly known as: Norco  Take 1 tablet by mouth every 6 hours as needed for Pain for up to 7 days. Take lowest dose possible to manage pain; may stop taking sooner than 7 days if pain is able to be controlled with non-narcotic analgesics and therapies. CONTINUE taking these medications      FLOMAX PO               Where to Get Your Medications        You can get these medications from any pharmacy    Bring a paper prescription for each of these medications  HYDROcodone-acetaminophen 5-325 MG per tablet       2. Diet as tolerated. 3. Activity: activity as tolerated, no driving while on analgesics, and no heavy lifting for 6 weeks.   4. Wound care: keep incisions clean and dry  5. Follow-up: recommend follow up with PCP in 2-4 weeks. 6. Okay to shower, don't submerge incisions under water. 7. No driving until off pain medication and able to move torso freely without any pain. 8. Return to hospital, or contact Dr. Brink [facility] office ([phone]) if any signs of infection are seen. 9. The patient is not currently smoking. Recommend maintaining a smoke-free lifestyle. 10. Return to Clinic: in 2 weeks. 11. Reviewed discharge instructions, restrictions, and reasons to call the office. EDUCATION:  Educated patient on plan of care and disease process--all questions answered. Plans discussed with patient and nursing. Reviewed and discussed with Dr. Miranda Martel. Time spent for discharge: 30 minutes, including plan of care, patient education, and care coordination.       Signed:  CLEMENTINA Iyer CNP  8/25/2022 10:33 AM

## 2022-08-25 NOTE — PROGRESS NOTES
Gave d/c instructions with list of active meds and when they are next due. Reviewed discharge instructions for ileostomy care at bedside and provided printed copy of same. Pt verbalized understanding of all instructions. Denied additional questions.

## 2022-08-25 NOTE — CARE COORDINATION
Ostomy Referral Follow-up Progress Note      NAME:  Jose Lewis RECORD NUMBER:  [id number]  AGE: 74 y.o. GENDER:  male  :  1955  TODAY'S DATE:  2022    Subjective: Judie Vuong is a 74 y.o. male referred by:   [x] Physician  [x] Nursing  [] Other:     New and Established    Objective    BP (!) 115/59   Pulse 71   Temp 97.7 °F (36.5 °C) (Oral)   Resp 16   Ht 6' (1.829 m)   Wt 200 lb 3.2 oz (90.8 kg)   SpO2 96%   BMI 27.15 kg/m²     Jayro Risk Score Jayro Scale Score: 21    Assessment   Surgeon Kami Tran    Ileostomy            Colostomy      Colostomy RUQ Loop (Active)   Stomal Appliance 2 piece 22 0900   Flange Size (inches) 2.25 Inches 22 1339   Stoma  Assessment [place] 22 0900   Peristomal Assessment Clean, dry & intact; Pink 22 0900   Treatment Bag change; Heat applied;Site care 22 1339   Stool Appearance Watery 22 0900   Stool Color Green 22 0900   Stool Amount Small 22 0900   Output (mL) 50 ml 22 0500   Number of days: 8       Urostomy               Intake/Output Summary (Last 24 hours) at 2022 1451  Last data filed at 2022 0815  Gross per 24 hour   Intake 4797.33 ml   Output 525 ml   Net 4272.33 ml       Plan:   Plan for Ostomy Care: ostomy education continued today. Red bridge removed by surgery today. Ostomy pouch intact. Patient did need pouch changed at this time. Patient did work with ostomy model with hands on demonstration with minimal coaching by [address] [place] [address] nurse. Ostomy Plan of Care  [] Supplies/Instructions left in room  [] Patient using home supplies  [x] Brand/supplies at bedside coloplast supplies     Current Diet: ADULT DIET;  Regular; Low Fiber  Dietician consult:  Yes    Discharge Plan:  Placement for patient upon discharge: home with support    Outpatient visit plan Yes  Supplies given Yes   Samples requested Yes    Referrals:  [x]   [x]  [facility]; [place] [facility] set up by case management  [] Supplies  [] Other      Patient/Caregiver Teaching:  Written Instructions given to patient/family  Teaching provided:  [x] Reviewed GI and A&P        [x] Supplies  [x] Pouch emptying      [x] Manipulate closure  [x] Routine Care         [x] Comment patient being discharged today. Patient demonstrated with ostomy model: pouch emptying, changing appliance, measuring and marking measurements on new wafer, cutting wafer, applying to stoma, attaching pouch.   [x] Pouch maintenance           Level of patient/caregiver understanding able to:  [x] Indicates understanding       [] Needs reinforcement  [] Unsuccessful      [] Verbal Understanding  [x] Demonstrated understanding       [] No evidence of learning  [] Refused teaching         [] N/A    Electronically signed by  WILLIAM DaughertyN, RN, [facility], [facility] [address] [facility]  [phone] on 8/25/2022 at 2:51 PM

## 2022-08-25 NOTE — PLAN OF CARE
Problem: Discharge Planning  Goal: Discharge to home or other facility with appropriate resources  Outcome: Progressing  Flowsheets (Taken 8/24/2022 2000)  Discharge to home or other facility with appropriate resources: Refer to discharge planning if patient needs post-hospital services based on physician order or complex needs related to functional status, cognitive ability or social support system     Problem: Safety - Adult  Goal: Free from fall injury  Outcome: Progressing     Problem: ABCDS Injury Assessment  Goal: Absence of physical injury  Outcome: Progressing     Problem: Pain  Goal: Verbalizes/displays adequate comfort level or baseline comfort level  Outcome: Progressing     Problem: Nutrition Deficit:  Goal: Optimize nutritional status  Outcome: Progressing

## 2022-08-25 NOTE — PROGRESS NOTES
Pt back to bed after up to bathroom performing his own ostomy care. Reports thin brown stool produced. Stoma is red and protrudes. Incisions to abd are WDL. Denied need for pain medication. Is hopeful for discharge today. Tolerating PO well. Pt is axox4 and able to answer questions and follow commands throughout assessment.

## 2022-08-26 NOTE — OP NOTE
[facility]                     [facility], [address] [place] [address]                                OPERATIVE REPORT    PATIENT NAME: Flako Self                    :        1955  MED REC NO:   [id number]                          ROOM:       [id number]  ACCOUNT NO:   [de-identified]                           ADMIT DATE: 2022  PROVIDER:     Kamille Ernst MD    DATE OF PROCEDURE:  2022    PREOPERATIVE DIAGNOSIS:  Colovesical fistula. POSTOPERATIVE DIAGNOSIS:  Colovesical fistula. PROCEDURE PERFORMED:  Robotic laparoscopic sigmoid colon resection with  coloproctostomy, repair of colovesical fistula, incidental appendectomy,  and end ileostomy. SURGEON:  Kamille Ernst MD.    ANESTHESIA:  General endotracheal.    ESTIMATED BLOOD LOSS:  Minimal.    COMPLICATIONS:  None. SPECIMEN:  Sigmoid colon, additional proximal colon segment, and  appendix. OPERATIVE INDICATIONS AND CONSENT:  The patient is a 78-year-old male  with a colovesical fistula secondary to diverticular disease. He is  brought in today for repair of the colovesical fistula, sigmoid colon  resection, and coloproctostomy. He was explained the risks, benefits,  and possible complications including risk of bleeding, bowel injury or  anastomotic leakage. DETAILS OF PROCEDURE:  The patient was brought to the operating suite  and placed in the supine position on the operating table. After general  endotracheal anesthesia, he was placed in dorsal lithotomy position and  then prepped and draped in the usual sterile fashion. We made an 8 mm transverse incision in the right upper quadrant just  below the costal margin. A Veress needle was passed into the peritoneal  cavity. After adequate insufflation, an 8 mm trocar was placed at this  site. An 8 mm trocar was placed in the lateral aspect of the right mid  abdomen, followed by a 12 mm trocar in the right lower quadrant. One  last 8 mm trocar was placed in the upper epigastric region. The robot  was docked to the trocar. The patient was placed in a Trendelenburg and  then rotated toward the right side. Throughout the course of the  procedure, we did add a 5 mm assist port in the right lateral abdomen. The patient's sigmoid colon was densely adherent to the bladder. The  colon was dissected off of bladder with some sharp dissection, some  blunt dissection and cautery. It was densely adherent and very  difficult. Between the bladder and the colon, we did enter a pocket of  purulent fluid. We continued the dissection, but ultimately began  working on the more proximal colon, where it was more normal appearing. The left colon was mobilized along the white line of Toldt up to the  splenic flexure. The splenic flexure was not formally mobilized. With  this new plane, we were able to continue dissection down toward the  pelvis. The sigmoid colon was eventually able to be peeled off of the  bladder. Basically, there was an abscess in the central pelvis. This  consisted of the sigmoid colon adjacent to the bladder. The appendix  was also within the abscess. As we dissected out the area, the sigmoid colon was completely freed  from the bladder. The appendix was significantly inflamed. We elected  to eventually proceed with resection of the appendix as well because of  significant damage from the inflammatory changes. The mesoappendix was  divided using the vessel sealing device and then the appendix was  divided at its base with a robotic stapler load. The appendix was then  brought out through the 12 mm trocar site. At this point in time, we had the sigmoid colon all the way up the  bladder. The bladder was inflated with 500 mL of saline, which had  methylene blue within it. There was some tenting of the bladder, but  there was no evidence of a full-thickness hole in the bladder.     We divided the colon proximal to the area of inflammatory changes with a  transverse firing of the robotic 60 mm green stapler load. The colon  was difficult to handle as it was very thickened and inflamed. The  mesocolon was divided using vessel sealing device. We were able to  approach the upper rectum, where it appeared fairly normal.  The upper  rectum was cleared circumferentially of all fatty tissue before it was  divided with a transverse firing of the robotic 60 mm green stapler  load. We selected a site for extraction of specimen in the left lower quadrant  of the abdomen. We made a 4 to 5 cm transverse incision. Dissection  was carried down to the level of external abdominal oblique. The  external abdominal oblique was opened transversely. The rectus  abdominis was traversed via muscle splitting fashion. Posterior fascia  and peritoneum were then opened up. An Marcelino retractor was placed in  the wound. The specimen was removed. The proximal colon was then  delivered through the opening. The proximal colon was cleared of all  fat approximately 2 cm proximal to the staple line. We then placed a pursestring device across the colon. It was fired and  then the distal portion of the colon was transected. This was sent as  additional proximal colon segment. The colon was sized, and we selected  a 29 mm stapler. The anvil was placed into the proximal colon and the  pursestring device was tied. For additional security, we placed  additional 2-0 silk pursestring suture around the anvil. After  inspecting the anvil, the colon was reduced in the abdominal cavity and  _____ cap was placed on the Marcelino retractor. The rectum was serially dilated and then the 29 mm stapler was brought  up through the rectum. The pin was brought out just anterior to the  previously placed staple line.   This was connected to the anvil and then  it was closed under direct visualization making sure that there was no  tissue between the two bowel segments. The proximal colon did have  proper orientation. The stapler was fired and removed. We did have two  complete anastomotic rings, which were sent for pathologic evaluation. Warm saline was placed in the pelvis. Air was insufflated through the  rectum with a proximal colon clamp. There was no evidence of  anastomotic leakage. Because of the significant inflammatory changes of the upper rectum, we  elected to divert the patient. Approximately 20 cm proximal to the  ileocecal valve, we selected an area of the terminal ileum. We used one  of our trocar site to create the ileostomy. A button of skin and  subcutaneous tissue was excised using cautery. We then dissected down  to the level of the external abdominal oblique. This was opened using  cautery. The rectus abdominis muscle was traversed via muscle splitting  fashion and then the peritoneum and posterior fascia were further  opened. The colon was delivered through the opening, checking for  proper orientation. We made a small window in the mesentery of the  ileostomy. A red rubber catheter was placed through it. The red rubber  catheter was cut and sutured as a bridge. The fascia of the 12 mm right lower quadrant trocar was closed with 0  Vicryl suture. The posterior fascia and peritoneum of the extraction  site was closed with running 0 Vicryl suture. The external abdominal  oblique was closed with running 0 PDS loop suture. The subcutaneous  tissue was painted with Betadine before it was closed with interrupted  3-0 Vicryl suture. The skin of all the incisions were closed with  running 4-0 subcuticular sutures. Dermabond was then applied to all the  incisions. The ileostomy was matured with interrupted 3-0 Vicryl sutures after the  antimesenteric surface was opened. Once the colostomy was matured, an  appliance was placed on it.     The patient tolerated the procedure without difficulty and was  transferred to recovery room in stable condition. Rachid Solares.  Noemi Castillo MD    D: 08/25/2022 20:13:26       T: 08/25/2022 20:17:53     [email or web address]  Job#: [id number]     Doc#: [id number]    CC:

## 2022-09-08 ENCOUNTER — OFFICE VISIT (OUTPATIENT)
Dept: SURGERY | Age: 67
End: 2022-09-08

## 2022-09-08 ENCOUNTER — PROCEDURE VISIT (OUTPATIENT)
Dept: VASCULAR SURGERY | Age: 67
End: 2022-09-08

## 2022-09-08 VITALS — WEIGHT: 198 LBS | BODY MASS INDEX: 26.85 KG/M2 | SYSTOLIC BLOOD PRESSURE: 100 MMHG | DIASTOLIC BLOOD PRESSURE: 68 MMHG

## 2022-09-08 DIAGNOSIS — M79.605 BILATERAL LEG PAIN: Primary | ICD-10-CM

## 2022-09-08 DIAGNOSIS — M79.604 BILATERAL LEG PAIN: Primary | ICD-10-CM

## 2022-09-08 DIAGNOSIS — N32.1 COLOVESICAL FISTULA: Primary | ICD-10-CM

## 2022-09-08 PROCEDURE — 99024 POSTOP FOLLOW-UP VISIT: CPT | Performed by: SURGERY

## 2022-09-15 ENCOUNTER — HOSPITAL ENCOUNTER (OUTPATIENT)
Dept: GENERAL RADIOLOGY | Age: 67
Discharge: HOME | End: 2022-09-15
Payer: MEDICARE

## 2022-09-15 DIAGNOSIS — N32.1 COLOVESICAL FISTULA: ICD-10-CM

## 2022-09-15 PROCEDURE — 74270 X-RAY XM COLON 1CNTRST STD: CPT

## 2022-09-21 ENCOUNTER — TELEPHONE (OUTPATIENT)
Dept: SURGERY | Age: 67
End: 2022-09-21

## 2022-09-22 NOTE — TELEPHONE ENCOUNTER
Spoke to dr Brenna Clemens, PT may be inpatient 1 to 2 days. .. Dr. Brenna Clemens said PT should be good to fly 10 to 12 days after .

## 2022-09-27 NOTE — PROGRESS NOTES
Name_______________________________________Printed:____________________  Date and time of surgery___10/10/22 @ 1000_____________________Arrival Time:__0830 main hosp______________   1. The instructions given regarding when and if a patient needs to stop oral intake prior to surgery varies. Follow the specific instructions you were given                  __x_Nothing to eat or to drink after Midnight the night before.                   ____Carbo loading or ERAS instructions will be given to select patients-if you have been given those instructions -please do the following                           The evening before your surgery after dinner before midnight drink 40 ounces of gatorade. If you are diabetic use sugar free. The morning of surgery drink 40 ounces of water. This needs to be finished 3 hours prior to your surgery start time. 2. Take the following pills with a small sip of water on the morning of surgery_______none____________________________________________                  Do not take blood pressure medications ending in pril or sartan the mansoor prior to surgery or the morning of surgery_   3. Aspirin, Ibuprofen, Advil, Naproxen, Vitamin E and other Anti-inflammatory products and supplements should be stopped for 5 -7days before surgery or as directed by your physician. 4. Check with your Doctor regarding stopping Plavix, Coumadin,Eliquis, Lovenox,Effient,Pradaxa,Xarelto, Fragmin or other blood thinners and follow their instructions. 5. Do not smoke, and do not drink any alcoholic beverages 24 hours prior to surgery. This includes NA Beer. Refrain from the usage of any recreational drugs. 6. You may brush your teeth and gargle the morning of surgery. DO NOT SWALLOW WATER   7. You MUST make arrangements for a responsible adult to stay on site while you are here and take you home after your surgery. You will not be allowed to leave alone or drive yourself home.   It is strongly suggested someone stay with you the first 24 hrs. Your surgery will be cancelled if you do not have a ride home. 8. A parent/legal guardian must accompany a child scheduled for surgery and plan to stay at the hospital until the child is discharged. Please do not bring other children with you. 9. Please wear simple, loose fitting clothing to the hospital.  Evangelina Dense not bring valuables (money, credit cards, checkbooks, etc.) Do not wear any makeup (including no eye makeup) or nail polish on your fingers or toes. 10. DO NOT wear any jewelry or piercings on day of surgery. All body piercing jewelry must be removed. 11. If you have ___dentures, they will be removed before going to the OR; we will provide you a container. If you wear ___contact lenses or ___glasses, they will be removed; please bring a case for them. 12. Please see your family doctor/pediatrician for a history & physical and/or concerning medications. Bring any test results/reports from your physician's office. PCP___fitz_______________Phone___________H&P Appt. Date________             13 If you  have a Living Will and Durable Power of  for Healthcare, please bring in a copy. 15. Notify your Surgeon if you develop any illness between now and surgery  time, cough, cold, fever, sore throat, nausea, vomiting, etc.  Please notify your surgeon if you experience dizziness, shortness of breath or blurred vision between now & the time of your surgery             15. DO NOT shave your operative site 96 hours prior to surgery. For face & neck surgery, men may use an electric razor 48 hours prior to surgery. 16. Shower the night before or morning of surgery using an antibacterial soap or as you have been instructed. 17. To provide excellent care visitors will be limited to one in the room at any given time. 18.  Please bring picture ID and insurance card.              19.  Visit our web site for additional information:  [email or web address]              20.During flu season no children under the age of 15 are permitted in the hospital for the safety of all patients. 21. If you take a long acting insulin in the evening only  take half of your usual  dose the night  before your procedure              22. If you use a c-pap please bring DOS if staying overnight,             23.For your convenience [facility] has a pharmacy on site to fill your prescriptions. 24. If you use oxygen and have a portable tank please bring it  with you the DOS             25. Bring a complete list of all your medications with name and dose include any supplements. 26. Other__________________________________________   *Please call pre admission testing if you any further questions   [facility] [address]    [place] [id number]. [place]  [phone]   [address] [place] [address]       VISITOR POLICY(subject to change)    Current policy is 2 visitors per patient. No children. A mask is required. Visiting hours are 8a-8p. Overnight visitors will be at the discretion of the nurse. All above information reviewed with patient in person or by phone. Patient verbalizes understanding. All questions and concerns addressed.                                                                                                  Patient/Rep_____pt_______________                                                                                                                                    PRE OP INSTRUCTIONS

## 2022-09-27 NOTE — TELEPHONE ENCOUNTER
Dionna called from [facility]. [phone]  The code #75455 is an outpatient code   She said she needs PT to be an outpatient and can be changed to Inpatient after surgery.    Please Advise

## 2022-09-27 NOTE — PROGRESS NOTES
Reviewed pt's medical history with Dr. Brittani Falcon - order received to do CBC and BMP day of surgery.

## 2022-10-06 ENCOUNTER — TELEPHONE (OUTPATIENT)
Dept: SURGERY | Age: 67
End: 2022-10-06

## 2022-10-06 NOTE — TELEPHONE ENCOUNTER
PATIENT SCHEDULED FOR REVERSAL LOOP ILEOSTOMY ON Monday, 10-10-22.  IS THERE ANY BOWEL PREP HE SHOULD BE DOING? [phone]

## 2022-10-10 ENCOUNTER — ANESTHESIA EVENT (OUTPATIENT)
Dept: OPERATING ROOM | Age: 67
DRG: 331 | End: 2022-10-10
Payer: MEDICARE

## 2022-10-10 ENCOUNTER — ANESTHESIA (OUTPATIENT)
Dept: OPERATING ROOM | Age: 67
DRG: 331 | End: 2022-10-10
Payer: MEDICARE

## 2022-10-10 ENCOUNTER — HOSPITAL ENCOUNTER (INPATIENT)
Age: 67
LOS: 1 days | Discharge: HOME | DRG: 331 | End: 2022-10-12
Attending: SURGERY | Admitting: SURGERY
Payer: MEDICARE

## 2022-10-10 DIAGNOSIS — Z01.818 PREOP TESTING: Primary | ICD-10-CM

## 2022-10-10 DIAGNOSIS — Z93.2 ILEOSTOMY IN PLACE (HCC): ICD-10-CM

## 2022-10-10 LAB
ANION GAP SERPL CALCULATED.3IONS-SCNC: 13 MMOL/L (ref 3–16)
BUN BLDV-MCNC: 13 MG/DL (ref 7–20)
CALCIUM SERPL-MCNC: 9.8 MG/DL (ref 8.3–10.6)
CHLORIDE BLD-SCNC: 102 MMOL/L (ref 99–110)
CO2: 25 MMOL/L (ref 21–32)
CREAT SERPL-MCNC: 0.7 MG/DL (ref 0.8–1.3)
GFR AFRICAN AMERICAN: >60
GFR NON-AFRICAN AMERICAN: >60
GLUCOSE BLD-MCNC: 112 MG/DL (ref 70–99)
HCT VFR BLD CALC: 42 % (ref 40.5–52.5)
HEMOGLOBIN: 14.2 G/DL (ref 13.5–17.5)
MCH RBC QN AUTO: 29.3 PG (ref 26–34)
MCHC RBC AUTO-ENTMCNC: 33.8 G/DL (ref 31–36)
MCV RBC AUTO: 86.8 FL (ref 80–100)
PDW BLD-RTO: 14.2 % (ref 12.4–15.4)
PLATELET # BLD: 320 K/UL (ref 135–450)
PMV BLD AUTO: 7 FL (ref 5–10.5)
POTASSIUM SERPL-SCNC: 4.1 MMOL/L (ref 3.5–5.1)
RBC # BLD: 4.84 M/UL (ref 4.2–5.9)
SODIUM BLD-SCNC: 140 MMOL/L (ref 136–145)
WBC # BLD: 6.7 K/UL (ref 4–11)

## 2022-10-10 PROCEDURE — 3600000004 HC SURGERY LEVEL 4 BASE: Performed by: SURGERY

## 2022-10-10 PROCEDURE — 2500000003 HC RX 250 WO HCPCS: Performed by: NURSE ANESTHETIST, CERTIFIED REGISTERED

## 2022-10-10 PROCEDURE — A4217 STERILE WATER/SALINE, 500 ML: HCPCS | Performed by: SURGERY

## 2022-10-10 PROCEDURE — 6360000002 HC RX W HCPCS: Performed by: SURGERY

## 2022-10-10 PROCEDURE — C9290 INJ, BUPIVACAINE LIPOSOME: HCPCS | Performed by: SURGERY

## 2022-10-10 PROCEDURE — 3700000001 HC ADD 15 MINUTES (ANESTHESIA): Performed by: SURGERY

## 2022-10-10 PROCEDURE — 88304 TISSUE EXAM BY PATHOLOGIST: CPT

## 2022-10-10 PROCEDURE — 0DBB0ZZ EXCISION OF ILEUM, OPEN APPROACH: ICD-10-PCS | Performed by: SURGERY

## 2022-10-10 PROCEDURE — 94150 VITAL CAPACITY TEST: CPT

## 2022-10-10 PROCEDURE — 6370000000 HC RX 637 (ALT 250 FOR IP): Performed by: NURSE PRACTITIONER

## 2022-10-10 PROCEDURE — 36415 COLL VENOUS BLD VENIPUNCTURE: CPT

## 2022-10-10 PROCEDURE — 6360000002 HC RX W HCPCS: Performed by: ANESTHESIOLOGY

## 2022-10-10 PROCEDURE — 6370000000 HC RX 637 (ALT 250 FOR IP): Performed by: SURGERY

## 2022-10-10 PROCEDURE — 3700000000 HC ANESTHESIA ATTENDED CARE: Performed by: SURGERY

## 2022-10-10 PROCEDURE — 3600000014 HC SURGERY LEVEL 4 ADDTL 15MIN: Performed by: SURGERY

## 2022-10-10 PROCEDURE — 7100000000 HC PACU RECOVERY - FIRST 15 MIN: Performed by: SURGERY

## 2022-10-10 PROCEDURE — 6360000002 HC RX W HCPCS: Performed by: NURSE ANESTHETIST, CERTIFIED REGISTERED

## 2022-10-10 PROCEDURE — 2580000003 HC RX 258: Performed by: SURGERY

## 2022-10-10 PROCEDURE — 2580000003 HC RX 258: Performed by: NURSE ANESTHETIST, CERTIFIED REGISTERED

## 2022-10-10 PROCEDURE — 7100000001 HC PACU RECOVERY - ADDTL 15 MIN: Performed by: SURGERY

## 2022-10-10 PROCEDURE — 2500000003 HC RX 250 WO HCPCS: Performed by: SURGERY

## 2022-10-10 PROCEDURE — 94760 N-INVAS EAR/PLS OXIMETRY 1: CPT

## 2022-10-10 PROCEDURE — 2720000010 HC SURG SUPPLY STERILE: Performed by: SURGERY

## 2022-10-10 PROCEDURE — 6370000000 HC RX 637 (ALT 250 FOR IP): Performed by: NURSE ANESTHETIST, CERTIFIED REGISTERED

## 2022-10-10 PROCEDURE — 44620 REPAIR BOWEL OPENING: CPT | Performed by: SURGERY

## 2022-10-10 PROCEDURE — 80048 BASIC METABOLIC PNL TOTAL CA: CPT

## 2022-10-10 PROCEDURE — 85027 COMPLETE CBC AUTOMATED: CPT

## 2022-10-10 PROCEDURE — 2709999900 HC NON-CHARGEABLE SUPPLY: Performed by: SURGERY

## 2022-10-10 RX ORDER — GLYCOPYRROLATE 0.2 MG/ML
INJECTION INTRAMUSCULAR; INTRAVENOUS PRN
Status: DISCONTINUED | OUTPATIENT
Start: 2022-10-10 | End: 2022-10-10 | Stop reason: SDUPTHER

## 2022-10-10 RX ORDER — LIDOCAINE HYDROCHLORIDE 40 MG/ML
SOLUTION TOPICAL PRN
Status: DISCONTINUED | OUTPATIENT
Start: 2022-10-10 | End: 2022-10-10 | Stop reason: SDUPTHER

## 2022-10-10 RX ORDER — DEXTROSE, SODIUM CHLORIDE, AND POTASSIUM CHLORIDE 5; .45; .15 G/100ML; G/100ML; G/100ML
INJECTION INTRAVENOUS CONTINUOUS
Status: DISCONTINUED | OUTPATIENT
Start: 2022-10-10 | End: 2022-10-11

## 2022-10-10 RX ORDER — LIDOCAINE HYDROCHLORIDE 10 MG/ML
1 INJECTION, SOLUTION EPIDURAL; INFILTRATION; INTRACAUDAL; PERINEURAL
Status: DISCONTINUED | OUTPATIENT
Start: 2022-10-10 | End: 2022-10-10 | Stop reason: HOSPADM

## 2022-10-10 RX ORDER — FENTANYL CITRATE 50 UG/ML
INJECTION, SOLUTION INTRAMUSCULAR; INTRAVENOUS PRN
Status: DISCONTINUED | OUTPATIENT
Start: 2022-10-10 | End: 2022-10-10 | Stop reason: SDUPTHER

## 2022-10-10 RX ORDER — BUPIVACAINE HYDROCHLORIDE AND EPINEPHRINE 5; 5 MG/ML; UG/ML
INJECTION, SOLUTION PERINEURAL
Status: COMPLETED | OUTPATIENT
Start: 2022-10-10 | End: 2022-10-10

## 2022-10-10 RX ORDER — DEXTROSE, SODIUM CHLORIDE, AND POTASSIUM CHLORIDE 5; .45; .15 G/100ML; G/100ML; G/100ML
INJECTION INTRAVENOUS
Status: DISPENSED
Start: 2022-10-10 | End: 2022-10-11

## 2022-10-10 RX ORDER — DEXMEDETOMIDINE HYDROCHLORIDE 100 UG/ML
INJECTION, SOLUTION INTRAVENOUS PRN
Status: DISCONTINUED | OUTPATIENT
Start: 2022-10-10 | End: 2022-10-10 | Stop reason: SDUPTHER

## 2022-10-10 RX ORDER — PROPOFOL 10 MG/ML
INJECTION, EMULSION INTRAVENOUS PRN
Status: DISCONTINUED | OUTPATIENT
Start: 2022-10-10 | End: 2022-10-10 | Stop reason: SDUPTHER

## 2022-10-10 RX ORDER — LIDOCAINE HYDROCHLORIDE 20 MG/ML
INJECTION, SOLUTION INFILTRATION; PERINEURAL PRN
Status: DISCONTINUED | OUTPATIENT
Start: 2022-10-10 | End: 2022-10-10 | Stop reason: SDUPTHER

## 2022-10-10 RX ORDER — ROCURONIUM BROMIDE 10 MG/ML
INJECTION, SOLUTION INTRAVENOUS PRN
Status: DISCONTINUED | OUTPATIENT
Start: 2022-10-10 | End: 2022-10-10 | Stop reason: SDUPTHER

## 2022-10-10 RX ORDER — ONDANSETRON 2 MG/ML
INJECTION INTRAMUSCULAR; INTRAVENOUS PRN
Status: DISCONTINUED | OUTPATIENT
Start: 2022-10-10 | End: 2022-10-10 | Stop reason: SDUPTHER

## 2022-10-10 RX ORDER — ONDANSETRON 2 MG/ML
4 INJECTION INTRAMUSCULAR; INTRAVENOUS
Status: DISCONTINUED | OUTPATIENT
Start: 2022-10-10 | End: 2022-10-10 | Stop reason: HOSPADM

## 2022-10-10 RX ORDER — HYDROCODONE BITARTRATE AND ACETAMINOPHEN 5; 325 MG/1; MG/1
1 TABLET ORAL EVERY 4 HOURS PRN
Status: DISCONTINUED | OUTPATIENT
Start: 2022-10-10 | End: 2022-10-12 | Stop reason: HOSPADM

## 2022-10-10 RX ORDER — HYDROMORPHONE HCL 110MG/55ML
0.5 PATIENT CONTROLLED ANALGESIA SYRINGE INTRAVENOUS EVERY 5 MIN PRN
Status: DISCONTINUED | OUTPATIENT
Start: 2022-10-10 | End: 2022-10-10 | Stop reason: HOSPADM

## 2022-10-10 RX ORDER — SODIUM CHLORIDE 9 MG/ML
INJECTION, SOLUTION INTRAVENOUS CONTINUOUS PRN
Status: DISCONTINUED | OUTPATIENT
Start: 2022-10-10 | End: 2022-10-10 | Stop reason: SDUPTHER

## 2022-10-10 RX ORDER — MAGNESIUM SULFATE HEPTAHYDRATE 500 MG/ML
INJECTION, SOLUTION INTRAMUSCULAR; INTRAVENOUS PRN
Status: DISCONTINUED | OUTPATIENT
Start: 2022-10-10 | End: 2022-10-10 | Stop reason: SDUPTHER

## 2022-10-10 RX ORDER — HYDRALAZINE HYDROCHLORIDE 20 MG/ML
10 INJECTION INTRAMUSCULAR; INTRAVENOUS
Status: DISCONTINUED | OUTPATIENT
Start: 2022-10-10 | End: 2022-10-10 | Stop reason: HOSPADM

## 2022-10-10 RX ORDER — MAGNESIUM HYDROXIDE 1200 MG/15ML
LIQUID ORAL CONTINUOUS PRN
Status: COMPLETED | OUTPATIENT
Start: 2022-10-10 | End: 2022-10-10

## 2022-10-10 RX ORDER — SODIUM CHLORIDE 0.9 % (FLUSH) 0.9 %
5-40 SYRINGE (ML) INJECTION PRN
Status: DISCONTINUED | OUTPATIENT
Start: 2022-10-10 | End: 2022-10-12 | Stop reason: HOSPADM

## 2022-10-10 RX ORDER — PROCHLORPERAZINE EDISYLATE 5 MG/ML
5 INJECTION INTRAMUSCULAR; INTRAVENOUS
Status: DISCONTINUED | OUTPATIENT
Start: 2022-10-10 | End: 2022-10-10 | Stop reason: HOSPADM

## 2022-10-10 RX ORDER — HYDROMORPHONE HYDROCHLORIDE 1 MG/ML
1 INJECTION, SOLUTION INTRAMUSCULAR; INTRAVENOUS; SUBCUTANEOUS
Status: DISCONTINUED | OUTPATIENT
Start: 2022-10-10 | End: 2022-10-12 | Stop reason: HOSPADM

## 2022-10-10 RX ORDER — HYDROMORPHONE HCL 110MG/55ML
PATIENT CONTROLLED ANALGESIA SYRINGE INTRAVENOUS PRN
Status: DISCONTINUED | OUTPATIENT
Start: 2022-10-10 | End: 2022-10-10 | Stop reason: SDUPTHER

## 2022-10-10 RX ORDER — SODIUM CHLORIDE 9 MG/ML
INJECTION, SOLUTION INTRAVENOUS PRN
Status: DISCONTINUED | OUTPATIENT
Start: 2022-10-10 | End: 2022-10-12 | Stop reason: HOSPADM

## 2022-10-10 RX ORDER — SUCCINYLCHOLINE/SOD CL,ISO/PF 200MG/10ML
SYRINGE (ML) INTRAVENOUS PRN
Status: DISCONTINUED | OUTPATIENT
Start: 2022-10-10 | End: 2022-10-10 | Stop reason: SDUPTHER

## 2022-10-10 RX ORDER — ENOXAPARIN SODIUM 100 MG/ML
40 INJECTION SUBCUTANEOUS DAILY
Status: DISCONTINUED | OUTPATIENT
Start: 2022-10-11 | End: 2022-10-12 | Stop reason: HOSPADM

## 2022-10-10 RX ORDER — FENTANYL CITRATE 50 UG/ML
25 INJECTION, SOLUTION INTRAMUSCULAR; INTRAVENOUS EVERY 5 MIN PRN
Status: DISCONTINUED | OUTPATIENT
Start: 2022-10-10 | End: 2022-10-10 | Stop reason: HOSPADM

## 2022-10-10 RX ORDER — SODIUM CHLORIDE 0.9 % (FLUSH) 0.9 %
5-40 SYRINGE (ML) INJECTION EVERY 12 HOURS SCHEDULED
Status: DISCONTINUED | OUTPATIENT
Start: 2022-10-10 | End: 2022-10-12 | Stop reason: HOSPADM

## 2022-10-10 RX ORDER — OXYCODONE HYDROCHLORIDE 5 MG/1
5 TABLET ORAL
Status: DISCONTINUED | OUTPATIENT
Start: 2022-10-10 | End: 2022-10-10 | Stop reason: HOSPADM

## 2022-10-10 RX ORDER — LABETALOL HYDROCHLORIDE 5 MG/ML
10 INJECTION, SOLUTION INTRAVENOUS
Status: DISCONTINUED | OUTPATIENT
Start: 2022-10-10 | End: 2022-10-10 | Stop reason: HOSPADM

## 2022-10-10 RX ORDER — KETOROLAC TROMETHAMINE 30 MG/ML
INJECTION, SOLUTION INTRAMUSCULAR; INTRAVENOUS PRN
Status: DISCONTINUED | OUTPATIENT
Start: 2022-10-10 | End: 2022-10-10 | Stop reason: SDUPTHER

## 2022-10-10 RX ORDER — METRONIDAZOLE 500 MG/100ML
500 INJECTION, SOLUTION INTRAVENOUS ONCE
Status: COMPLETED | OUTPATIENT
Start: 2022-10-10 | End: 2022-10-10

## 2022-10-10 RX ORDER — DEXAMETHASONE SODIUM PHOSPHATE 4 MG/ML
INJECTION, SOLUTION INTRA-ARTICULAR; INTRALESIONAL; INTRAMUSCULAR; INTRAVENOUS; SOFT TISSUE PRN
Status: DISCONTINUED | OUTPATIENT
Start: 2022-10-10 | End: 2022-10-10 | Stop reason: SDUPTHER

## 2022-10-10 RX ORDER — MIDAZOLAM HYDROCHLORIDE 1 MG/ML
INJECTION INTRAMUSCULAR; INTRAVENOUS PRN
Status: DISCONTINUED | OUTPATIENT
Start: 2022-10-10 | End: 2022-10-10 | Stop reason: SDUPTHER

## 2022-10-10 RX ORDER — ONDANSETRON 2 MG/ML
4 INJECTION INTRAMUSCULAR; INTRAVENOUS EVERY 6 HOURS PRN
Status: DISCONTINUED | OUTPATIENT
Start: 2022-10-10 | End: 2022-10-12 | Stop reason: HOSPADM

## 2022-10-10 RX ORDER — TAMSULOSIN HYDROCHLORIDE 0.4 MG/1
0.4 CAPSULE ORAL DAILY
Status: DISCONTINUED | OUTPATIENT
Start: 2022-10-10 | End: 2022-10-12 | Stop reason: HOSPADM

## 2022-10-10 RX ORDER — HYDROCODONE BITARTRATE AND ACETAMINOPHEN 5; 325 MG/1; MG/1
2 TABLET ORAL EVERY 4 HOURS PRN
Status: DISCONTINUED | OUTPATIENT
Start: 2022-10-10 | End: 2022-10-12 | Stop reason: HOSPADM

## 2022-10-10 RX ORDER — EPHEDRINE SULFATE/0.9% NACL/PF 50 MG/5 ML
SYRINGE (ML) INTRAVENOUS PRN
Status: DISCONTINUED | OUTPATIENT
Start: 2022-10-10 | End: 2022-10-10 | Stop reason: SDUPTHER

## 2022-10-10 RX ADMIN — KETOROLAC TROMETHAMINE 30 MG: 30 INJECTION, SOLUTION INTRAMUSCULAR; INTRAVENOUS at 11:40

## 2022-10-10 RX ADMIN — PHENYLEPHRINE HYDROCHLORIDE 100 MCG: 10 INJECTION INTRAVENOUS at 10:26

## 2022-10-10 RX ADMIN — CEFAZOLIN 2000 MG: 2 INJECTION, POWDER, FOR SOLUTION INTRAMUSCULAR; INTRAVENOUS at 10:02

## 2022-10-10 RX ADMIN — HYDROMORPHONE HYDROCHLORIDE 0.25 MG: 2 INJECTION, SOLUTION INTRAMUSCULAR; INTRAVENOUS; SUBCUTANEOUS at 11:49

## 2022-10-10 RX ADMIN — HYDROMORPHONE HYDROCHLORIDE 0.5 MG: 2 INJECTION, SOLUTION INTRAMUSCULAR; INTRAVENOUS; SUBCUTANEOUS at 11:59

## 2022-10-10 RX ADMIN — SODIUM CHLORIDE: 9 INJECTION, SOLUTION INTRAVENOUS at 11:18

## 2022-10-10 RX ADMIN — HYDROMORPHONE HYDROCHLORIDE 0.5 MG: 2 INJECTION, SOLUTION INTRAMUSCULAR; INTRAVENOUS; SUBCUTANEOUS at 11:53

## 2022-10-10 RX ADMIN — PHENYLEPHRINE HYDROCHLORIDE 200 MCG: 10 INJECTION INTRAVENOUS at 10:18

## 2022-10-10 RX ADMIN — POTASSIUM CHLORIDE, DEXTROSE MONOHYDRATE AND SODIUM CHLORIDE: 150; 5; 450 INJECTION, SOLUTION INTRAVENOUS at 23:34

## 2022-10-10 RX ADMIN — SUGAMMADEX 200 MG: 100 INJECTION, SOLUTION INTRAVENOUS at 11:45

## 2022-10-10 RX ADMIN — TAMSULOSIN HYDROCHLORIDE 0.4 MG: 0.4 CAPSULE ORAL at 17:19

## 2022-10-10 RX ADMIN — PHENYLEPHRINE HYDROCHLORIDE 100 MCG: 10 INJECTION INTRAVENOUS at 10:32

## 2022-10-10 RX ADMIN — MIDAZOLAM 2 MG: 1 INJECTION INTRAMUSCULAR; INTRAVENOUS at 10:02

## 2022-10-10 RX ADMIN — ROCURONIUM BROMIDE 30 MG: 10 INJECTION, SOLUTION INTRAVENOUS at 10:19

## 2022-10-10 RX ADMIN — DEXMEDETOMIDINE HYDROCHLORIDE 4 MCG: 100 INJECTION, SOLUTION INTRAVENOUS at 11:03

## 2022-10-10 RX ADMIN — PHENYLEPHRINE HYDROCHLORIDE 100 MCG: 10 INJECTION INTRAVENOUS at 10:15

## 2022-10-10 RX ADMIN — GLYCOPYRROLATE 0.1 MG: 0.2 INJECTION, SOLUTION INTRAMUSCULAR; INTRAVENOUS at 10:23

## 2022-10-10 RX ADMIN — PHENYLEPHRINE HYDROCHLORIDE 200 MCG: 10 INJECTION INTRAVENOUS at 10:22

## 2022-10-10 RX ADMIN — LIDOCAINE HYDROCHLORIDE 3 ML: 40 SOLUTION TOPICAL at 10:11

## 2022-10-10 RX ADMIN — LIDOCAINE HYDROCHLORIDE 80 MG: 20 INJECTION, SOLUTION INFILTRATION; PERINEURAL at 10:08

## 2022-10-10 RX ADMIN — ROCURONIUM BROMIDE 10 MG: 10 INJECTION, SOLUTION INTRAVENOUS at 10:38

## 2022-10-10 RX ADMIN — MAGNESIUM SULFATE HEPTAHYDRATE 1 G: 500 INJECTION, SOLUTION INTRAMUSCULAR; INTRAVENOUS at 10:18

## 2022-10-10 RX ADMIN — DEXMEDETOMIDINE HYDROCHLORIDE 4 MCG: 100 INJECTION, SOLUTION INTRAVENOUS at 11:49

## 2022-10-10 RX ADMIN — POTASSIUM CHLORIDE, DEXTROSE MONOHYDRATE AND SODIUM CHLORIDE: 150; 5; 450 INJECTION, SOLUTION INTRAVENOUS at 13:44

## 2022-10-10 RX ADMIN — Medication 120 MG: at 10:09

## 2022-10-10 RX ADMIN — HYDROMORPHONE HYDROCHLORIDE 0.5 MG: 2 INJECTION, SOLUTION INTRAMUSCULAR; INTRAVENOUS; SUBCUTANEOUS at 10:53

## 2022-10-10 RX ADMIN — FENTANYL CITRATE 50 MCG: 50 INJECTION, SOLUTION INTRAMUSCULAR; INTRAVENOUS at 10:08

## 2022-10-10 RX ADMIN — ROCURONIUM BROMIDE 10 MG: 10 INJECTION, SOLUTION INTRAVENOUS at 11:01

## 2022-10-10 RX ADMIN — HYDROMORPHONE HYDROCHLORIDE 0.25 MG: 2 INJECTION, SOLUTION INTRAMUSCULAR; INTRAVENOUS; SUBCUTANEOUS at 11:05

## 2022-10-10 RX ADMIN — ONDANSETRON 4 MG: 2 INJECTION INTRAMUSCULAR; INTRAVENOUS at 10:18

## 2022-10-10 RX ADMIN — SODIUM CHLORIDE: 9 INJECTION, SOLUTION INTRAVENOUS at 09:26

## 2022-10-10 RX ADMIN — METRONIDAZOLE 500 MG: 500 INJECTION, SOLUTION INTRAVENOUS at 10:17

## 2022-10-10 RX ADMIN — ROCURONIUM BROMIDE 10 MG: 10 INJECTION, SOLUTION INTRAVENOUS at 10:08

## 2022-10-10 RX ADMIN — Medication 5 MG: at 10:45

## 2022-10-10 RX ADMIN — HYDROMORPHONE HYDROCHLORIDE 0.5 MG: 2 INJECTION, SOLUTION INTRAMUSCULAR; INTRAVENOUS; SUBCUTANEOUS at 13:10

## 2022-10-10 RX ADMIN — DEXAMETHASONE SODIUM PHOSPHATE 8 MG: 4 INJECTION, SOLUTION INTRAMUSCULAR; INTRAVENOUS at 10:18

## 2022-10-10 RX ADMIN — HYDROCODONE BITARTRATE AND ACETAMINOPHEN 2 TABLET: 5; 325 TABLET ORAL at 22:19

## 2022-10-10 RX ADMIN — DEXMEDETOMIDINE HYDROCHLORIDE 8 MCG: 100 INJECTION, SOLUTION INTRAVENOUS at 10:22

## 2022-10-10 RX ADMIN — PHENYLEPHRINE HYDROCHLORIDE 100 MCG: 10 INJECTION INTRAVENOUS at 10:11

## 2022-10-10 RX ADMIN — Medication 10 MG: at 10:28

## 2022-10-10 RX ADMIN — HYDROCODONE BITARTRATE AND ACETAMINOPHEN 2 TABLET: 5; 325 TABLET ORAL at 17:19

## 2022-10-10 RX ADMIN — PHENYLEPHRINE HYDROCHLORIDE 100 MCG: 10 INJECTION INTRAVENOUS at 10:38

## 2022-10-10 RX ADMIN — FENTANYL CITRATE 50 MCG: 50 INJECTION, SOLUTION INTRAMUSCULAR; INTRAVENOUS at 10:40

## 2022-10-10 RX ADMIN — ROCURONIUM BROMIDE 10 MG: 10 INJECTION, SOLUTION INTRAVENOUS at 11:23

## 2022-10-10 RX ADMIN — PROPOFOL 200 MG: 10 INJECTION, EMULSION INTRAVENOUS at 10:08

## 2022-10-10 ASSESSMENT — PAIN DESCRIPTION - LOCATION
LOCATION: ABDOMEN

## 2022-10-10 ASSESSMENT — PAIN - FUNCTIONAL ASSESSMENT
PAIN_FUNCTIONAL_ASSESSMENT: NONE - DENIES PAIN
PAIN_FUNCTIONAL_ASSESSMENT: ACTIVITIES ARE NOT PREVENTED
PAIN_FUNCTIONAL_ASSESSMENT: PREVENTS OR INTERFERES SOME ACTIVE ACTIVITIES AND ADLS
PAIN_FUNCTIONAL_ASSESSMENT: ACTIVITIES ARE NOT PREVENTED

## 2022-10-10 ASSESSMENT — PAIN DESCRIPTION - ORIENTATION: ORIENTATION: RIGHT

## 2022-10-10 ASSESSMENT — PAIN SCALES - GENERAL
PAINLEVEL_OUTOF10: 7
PAINLEVEL_OUTOF10: 6
PAINLEVEL_OUTOF10: 7

## 2022-10-10 ASSESSMENT — PAIN DESCRIPTION - DESCRIPTORS
DESCRIPTORS: DISCOMFORT
DESCRIPTORS: DISCOMFORT

## 2022-10-10 ASSESSMENT — ENCOUNTER SYMPTOMS: SHORTNESS OF BREATH: 0

## 2022-10-10 NOTE — PROGRESS NOTES
Arrived from PACU. A&O x4. Rates pain 6/10. Dressing to abd CDI. Tinkling bowel sounds x4. VSS. Pt. W/ ginger ale in hand. Orders released, reviewed with patient. No questions at this time. SCD's on. Bed alarm engaged. Call light & BST within reach. The care plan and education has been reviewed and mutually agreed upon with the patient.

## 2022-10-10 NOTE — PROGRESS NOTES
Pt awake and oriented, vss, weaned to RA , pain controlled, tolerating sips, family updated on status. Phase 1 discharge criteria met. Pt awaiting room to be assigned.

## 2022-10-10 NOTE — PROGRESS NOTES
10/10/22 1707   Incentive Spirometry Tx   Treatment Effort Initial;Assisted by RT   Predicted Volume 663   Achieved Volume (mL) 1000 mL

## 2022-10-10 NOTE — PROGRESS NOTES
Pt admitted to PACU, awakening and following commands, abd isabel CD&I, ice pack applied.  Vss, NSR on tele

## 2022-10-10 NOTE — ANESTHESIA PRE PROCEDURE
Department of Anesthesiology  Preprocedure Note       Name:  Harmeet Dominguez   Age:  69 y.o.  :  1955                                          MRN:  [id number]         Date:  10/10/2022      Surgeon: Cee Ramos):  Augie Diaz MD    Procedure: Procedure(s):  REVERSAL LOOP ILEOSTOMY    Medications prior to admission:   Prior to Admission medications    Medication Sig Start Date End Date Taking? Authorizing Provider   Tamsulosin HCl (FLOMAX PO) Take by mouth every other day    Historical Provider, MD       Current medications:    No current facility-administered medications for this encounter. Allergies:  No Known Allergies    Problem List:    Patient Active Problem List   Diagnosis Code    Colovesical fistula N32.1       Past Medical History:  History reviewed. No pertinent past medical history. Past Surgical History:        Procedure Laterality Date    COLECTOMY N/A 2022    ROBOT ASSIST LAPAROSCOPIC SIGMOID COLON RESECTION WITH REPAIR OF COLOVESICAL FISTULA, APPENDECTOMY, LOOP ILEOSTOMY performed by Augie Diaz MD at [place] [id number] History:    Social History     Tobacco Use    Smoking status: Former     Types: Cigarettes     Quit date: 2022     Years since quittin.4    Smokeless tobacco: Never   Substance Use Topics    Alcohol use: Yes     Comment: occasionally                                Counseling given: Not Answered      Vital Signs (Current):   Vitals:    22 1438   Weight: 200 lb (90.7 kg)   Height: 6' (1.829 m)                                              BP Readings from Last 3 Encounters:   22 100/68   22 (!) 115/59   22 110/70       NPO Status:                                                                                 BMI:   Wt Readings from Last 3 Encounters:   22 200 lb (90.7 kg)   22 198 lb (89.8 kg)   22 200 lb 3.2 oz (90.8 kg)     Body mass index is 27.12 kg/m².     CBC:   Lab Results   Component Value Date/Time    WBC 8.4 08/23/2022 10:57 AM    RBC 4.85 08/23/2022 10:57 AM    HGB 13.9 08/23/2022 10:57 AM    HCT 41.5 08/23/2022 10:57 AM    MCV 85.6 08/23/2022 10:57 AM    RDW 13.5 08/23/2022 10:57 AM     08/23/2022 10:57 AM       CMP:   Lab Results   Component Value Date/Time     08/23/2022 10:57 AM    K 4.3 08/23/2022 10:57 AM    CL 97 08/23/2022 10:57 AM    CO2 30 08/23/2022 10:57 AM    BUN 6 08/23/2022 10:57 AM    CREATININE 0.6 08/23/2022 10:57 AM    GFRAA >60 08/23/2022 10:57 AM    LABGLOM >60 08/23/2022 10:57 AM    GLUCOSE 139 08/23/2022 10:57 AM    CALCIUM 9.6 08/23/2022 10:57 AM       POC Tests: No results for input(s): POCGLU, POCNA, POCK, POCCL, POCBUN, POCHEMO, POCHCT in the last 72 hours. Coags: No results found for: PROTIME, INR, APTT    HCG (If Applicable): No results found for: PREGTESTUR, PREGSERUM, HCG, HCGQUANT     ABGs: No results found for: PHART, PO2ART, AXB8NAK, AVQ4BPE, BEART, F7MTXZJJ     Type & Screen (If Applicable):  No results found for: LABABO, LABRH    Drug/Infectious Status (If Applicable):  No results found for: HIV, HEPCAB    COVID-19 Screening (If Applicable): No results found for: COVID19        Anesthesia Evaluation  Patient summary reviewed and Nursing notes reviewed no history of anesthetic complications:   Airway: Mallampati: I  TM distance: >3 FB   Neck ROM: full  Mouth opening: > = 3 FB   Dental: normal exam         Pulmonary:       (-) asthma and shortness of breath                           Cardiovascular:        (-) hypertension and  angina                Neuro/Psych:      (-) CVA           GI/Hepatic/Renal:        (-) GERD and liver disease       Endo/Other:        (-) diabetes mellitus, hypothyroidism               Abdominal:             Vascular:     - PVD. Other Findings:           Anesthesia Plan      general     ASA 2       Induction: intravenous.     MIPS: Postoperative opioids intended and Prophylactic antiemetics administered. Anesthetic plan and risks discussed with patient. Use of blood products discussed with patient whom. Plan discussed with CRNA.                     Kathryn Goldman MD   10/10/2022

## 2022-10-10 NOTE — H&P
Kesha Coppola     HPI: 69year old male here for ileostomy reversal     History reviewed. No pertinent past medical history. Past Surgical History:   Procedure Laterality Date    COLECTOMY N/A 2022    ROBOT ASSIST LAPAROSCOPIC SIGMOID COLON RESECTION WITH REPAIR OF COLOVESICAL FISTULA, APPENDECTOMY, LOOP ILEOSTOMY performed by Tash Brooks MD at [address] [place] [address] History     Socioeconomic History    Marital status: Single     Spouse name: Not on file    Number of children: Not on file    Years of education: Not on file    Highest education level: Not on file   Occupational History    Not on file   Tobacco Use    Smoking status: Former     Types: Cigarettes     Quit date: 2022     Years since quittin.4    Smokeless tobacco: Never   Vaping Use    Vaping Use: Never used   Substance and Sexual Activity    Alcohol use: Yes     Comment: occasionally    Drug use: Yes     Frequency: 2.0 times per week     Types: Marijuana Deadra Dhiraj)     Comment: occassional    Sexual activity: Not on file   Other Topics Concern    Not on file   Social History Narrative    Not on file     Social Determinants of Health     Financial Resource Strain: Not on file   Food Insecurity: Not on file   Transportation Needs: Not on file   Physical Activity: Not on file   Stress: Not on file   Social Connections: Not on file   Intimate Partner Violence: Not on file   Housing Stability: Not on file       Allergies: No Known Allergies    Prior to Admission medications    Medication Sig Start Date End Date Taking? Authorizing Provider   Tamsulosin HCl (FLOMAX PO) Take by mouth every other day    Historical Provider, MD       Active Problems:    * No active hospital problems. *  Resolved Problems:    * No resolved hospital problems. *      Blood pressure 120/77, pulse 82, temperature 97 °F (36.1 °C), temperature source Temporal, resp. rate 16, height 6' (1.829 m), weight 202 lb 3.2 oz (91.7 kg), SpO2 98 %.     Review of Systems    Physical Exam  Cardiovascular:      Rate and Rhythm: Normal rate and regular rhythm. Pulmonary:      Effort: Pulmonary effort is normal.      Breath sounds: Normal breath sounds.        Assessment:  S/p ileostomy    Plan:  Ileostomy reversal    Naida Llanes MD  10/10/2022

## 2022-10-10 NOTE — CARE COORDINATION
Discharge Planning Note:    Chart reviewed and it appears that patient has minimal needs for discharge at this time. Discussed with patient and requested that case management be notified if discharge needs are identified.     - Current discharge plan is for the patient to return home. - PCP: Roman العلي, DO    Case management will continue to follow progress and update discharge plan as needed. Risk of Readmission Score:  Outpatient in a bed    DANNY Fofana RN    [facility]  Phone: [phone]

## 2022-10-11 PROBLEM — Z98.890 S/P CLOSURE OF ILEOSTOMY: Status: ACTIVE | Noted: 2022-10-11

## 2022-10-11 LAB
BASOPHILS ABSOLUTE: 0 K/UL (ref 0–0.2)
BASOPHILS RELATIVE PERCENT: 0.2 %
EOSINOPHILS ABSOLUTE: 0 K/UL (ref 0–0.6)
EOSINOPHILS RELATIVE PERCENT: 0 %
HCT VFR BLD CALC: 36 % (ref 40.5–52.5)
HEMOGLOBIN: 12.2 G/DL (ref 13.5–17.5)
LYMPHOCYTES ABSOLUTE: 0.8 K/UL (ref 1–5.1)
LYMPHOCYTES RELATIVE PERCENT: 7.7 %
MCH RBC QN AUTO: 29.3 PG (ref 26–34)
MCHC RBC AUTO-ENTMCNC: 33.9 G/DL (ref 31–36)
MCV RBC AUTO: 86.5 FL (ref 80–100)
MONOCYTES ABSOLUTE: 0.4 K/UL (ref 0–1.3)
MONOCYTES RELATIVE PERCENT: 3.6 %
NEUTROPHILS ABSOLUTE: 9.1 K/UL (ref 1.7–7.7)
NEUTROPHILS RELATIVE PERCENT: 88.5 %
PDW BLD-RTO: 14 % (ref 12.4–15.4)
PLATELET # BLD: 299 K/UL (ref 135–450)
PMV BLD AUTO: 7 FL (ref 5–10.5)
RBC # BLD: 4.17 M/UL (ref 4.2–5.9)
WBC # BLD: 10.3 K/UL (ref 4–11)

## 2022-10-11 PROCEDURE — 99024 POSTOP FOLLOW-UP VISIT: CPT | Performed by: SURGERY

## 2022-10-11 PROCEDURE — 94760 N-INVAS EAR/PLS OXIMETRY 1: CPT

## 2022-10-11 PROCEDURE — 85025 COMPLETE CBC W/AUTO DIFF WBC: CPT

## 2022-10-11 PROCEDURE — APPSS15 APP SPLIT SHARED TIME 0-15 MINUTES: Performed by: NURSE PRACTITIONER

## 2022-10-11 PROCEDURE — 36415 COLL VENOUS BLD VENIPUNCTURE: CPT

## 2022-10-11 PROCEDURE — APPNB30 APP NON BILLABLE TIME 0-30 MINS: Performed by: NURSE PRACTITIONER

## 2022-10-11 PROCEDURE — 6370000000 HC RX 637 (ALT 250 FOR IP): Performed by: SURGERY

## 2022-10-11 PROCEDURE — 6370000000 HC RX 637 (ALT 250 FOR IP): Performed by: NURSE PRACTITIONER

## 2022-10-11 PROCEDURE — 1200000000 HC SEMI PRIVATE

## 2022-10-11 PROCEDURE — 6360000002 HC RX W HCPCS: Performed by: SURGERY

## 2022-10-11 RX ORDER — HYDROCODONE BITARTRATE AND ACETAMINOPHEN 5; 325 MG/1; MG/1
1 TABLET ORAL EVERY 6 HOURS PRN
Qty: 15 TABLET | Refills: 0 | Status: SHIPPED | OUTPATIENT
Start: 2022-10-11 | End: 2022-10-18

## 2022-10-11 RX ADMIN — HYDROCODONE BITARTRATE AND ACETAMINOPHEN 2 TABLET: 5; 325 TABLET ORAL at 08:59

## 2022-10-11 RX ADMIN — ENOXAPARIN SODIUM 40 MG: 100 INJECTION SUBCUTANEOUS at 08:59

## 2022-10-11 RX ADMIN — TAMSULOSIN HYDROCHLORIDE 0.4 MG: 0.4 CAPSULE ORAL at 08:59

## 2022-10-11 ASSESSMENT — PAIN DESCRIPTION - ONSET
ONSET: ON-GOING

## 2022-10-11 ASSESSMENT — PAIN SCALES - GENERAL
PAINLEVEL_OUTOF10: 7
PAINLEVEL_OUTOF10: 0
PAINLEVEL_OUTOF10: 2
PAINLEVEL_OUTOF10: 2
PAINLEVEL_OUTOF10: 9
PAINLEVEL_OUTOF10: 0
PAINLEVEL_OUTOF10: 8

## 2022-10-11 ASSESSMENT — PAIN - FUNCTIONAL ASSESSMENT
PAIN_FUNCTIONAL_ASSESSMENT: ACTIVITIES ARE NOT PREVENTED

## 2022-10-11 ASSESSMENT — PAIN DESCRIPTION - LOCATION
LOCATION: ABDOMEN

## 2022-10-11 ASSESSMENT — PAIN DESCRIPTION - PAIN TYPE
TYPE: SURGICAL PAIN

## 2022-10-11 ASSESSMENT — PAIN DESCRIPTION - FREQUENCY
FREQUENCY: INTERMITTENT

## 2022-10-11 ASSESSMENT — PAIN DESCRIPTION - ORIENTATION
ORIENTATION: RIGHT

## 2022-10-11 ASSESSMENT — PAIN DESCRIPTION - DESCRIPTORS
DESCRIPTORS: SHARP
DESCRIPTORS: DISCOMFORT
DESCRIPTORS: SHARP

## 2022-10-11 NOTE — PLAN OF CARE
Problem: Discharge Planning  Goal: Discharge to home or other facility with appropriate resources  Recent Flowsheet Documentation  Taken 10/11/2022 0853 by Lisbeth Lopez RN  Discharge to home or other facility with appropriate resources:   Identify barriers to discharge with patient and caregiver   Refer to discharge planning if patient needs post-hospital services based on physician order or complex needs related to functional status, cognitive ability or social support system  10/11/2022 0110 by Marcus Haywood RN  Outcome: Progressing     Problem: Pain  Goal: Verbalizes/displays adequate comfort level or baseline comfort level  Recent Flowsheet Documentation  Taken 10/11/2022 0853 by Lisbeth Lopez RN  Verbalizes/displays adequate comfort level or baseline comfort level:   Encourage patient to monitor pain and request assistance   Assess pain using appropriate pain scale   Administer analgesics based on type and severity of pain and evaluate response  10/11/2022 0110 by Marcus Haywood RN  Outcome: Progressing     Problem: Skin/Tissue Integrity  Goal: Absence of new skin breakdown  Description: 1. Monitor for areas of redness and/or skin breakdown  2. Assess vascular access sites hourly  3. Every 4-6 hours minimum:  Change oxygen saturation probe site  4. Every 4-6 hours:  If on nasal continuous positive airway pressure, respiratory therapy assess nares and determine need for appliance change or resting period.   10/11/2022 0110 by Marcus Haywood RN  Outcome: Progressing     Problem: Safety - Adult  Goal: Free from fall injury  10/11/2022 0110 by Marcus Haywood RN  Outcome: Progressing     Problem: ABCDS Injury Assessment  Goal: Absence of physical injury  10/11/2022 0110 by Marcus Haywood RN  Outcome: Progressing     Problem: Gastrointestinal - Adult  Goal: Minimal or absence of nausea and vomiting  Outcome: Progressing  Goal: Maintains or returns to baseline bowel function  Outcome: Progressing  Goal: Maintains adequate nutritional intake  Outcome: Progressing

## 2022-10-11 NOTE — PROGRESS NOTES
[facility]        Progress Note    Patient Name: Adele Phoenix  MRN: [id number]  YOB: 1955  Date of Evaluation: 10/11/2022    Subjective:  No acute events overnight  Pain controlled  No nausea or vomiting, tolerating clear liquid diet  Passing flatus, no BM, mild bloating  Resting in bed at this time    Post-Operative Day #1      Vital Signs:  Patient Vitals for the past 24 hrs:   BP Temp Temp src Pulse Resp SpO2 Height Weight   10/11/22 1212 (!) 105/58 97.8 °F (36.6 °C) Oral 70 17 96 % -- --   10/11/22 0906 -- -- -- 65 -- -- -- --   10/11/22 0853 -- -- -- -- -- 95 % -- --   10/11/22 0800 120/74 97.6 °F (36.4 °C) Oral 68 18 95 % -- --   10/11/22 0448 -- -- -- -- -- -- 6' (1.829 m) --   10/11/22 0441 -- -- -- -- -- -- -- 215 lb 12.8 oz (97.9 kg)   10/11/22 0430 108/67 97.8 °F (36.6 °C) Oral 65 17 95 % -- --   10/11/22 0015 127/67 97.9 °F (36.6 °C) Oral 79 18 95 % -- --   10/10/22 2142 128/75 98.1 °F (36.7 °C) Oral 81 18 96 % -- --   10/10/22 1715 120/78 97.8 °F (36.6 °C) Oral 70 17 96 % -- --   10/10/22 1707 -- -- -- 74 18 95 % -- --   10/10/22 1415 115/71 97.6 °F (36.4 °C) -- 71 16 94 % -- --   10/10/22 1343 112/66 97.6 °F (36.4 °C) -- 73 15 94 % -- --   10/10/22 1300 104/62 -- -- 69 12 93 % -- --   10/10/22 1245 107/66 -- -- 72 14 93 % -- --      TEMPERATURE HISTORY 24H: Temp (24hrs), Av.8 °F (36.6 °C), Min:97.6 °F (36.4 °C), Max:98.1 °F (36.7 °C)    BLOOD PRESSURE HISTORY: Systolic (00DFI), OAZ:603 , Min:104 , LMY:230    Diastolic (97QRW), DYK:72, Min:58, Max:78      Intake/Output:  I/O last 3 completed shifts: In: 1350 [I.V.:1250; IV Piggyback:100]  Out: 800 [Urine:800]  I/O this shift:   In: 500 [P.O.:500]  Out: 650 [Urine:650]  Drain/tube Output:       Physical Exam:  General: awake, alert, oriented to  person, place, time  Lungs: unlabored respirations  Abdomen: soft, mildly distended, incisional tenderness only, bowel sounds present   Skin/Wound: surgical dressing in place, no drainage    Labs:  CBC:    Recent Labs     10/10/22  0853 10/11/22  0552   WBC 6.7 10.3   HGB 14.2 12.2*   HCT 42.0 36.0*    299     BMP:    Recent Labs     10/10/22  0853      K 4.1      CO2 25   BUN 13   CREATININE 0.7*   GLUCOSE 112*     Hepatic:  No results for input(s): AST, ALT, ALB, BILITOT, ALKPHOS in the last 72 hours. Amylase:  No results found for: AMYLASE  Lipase:  No results found for: LIPASE   Mag:  No results found for: MG  Phos:   No results found for: PHOS   Coags: No results found for: PROTIME, INR, APTT    Cultures:  Anaerobic culture  No results found for: LABANAE  Fungus stain  No results found for requested labs within last 30 days. Gram stain  No results found for requested labs within last 30 days. Organism  No results found for: [facility]  Surgical culture  No results found for: CXSURG  Blood culture 1  No results found for requested labs within last 30 days. Blood culture 2  No results found for requested labs within last 30 days. Fecal occult  No results found for requested labs within last 30 days. GI bacterial pathogens by PCR  No results found for requested labs within last 30 days. C. difficile  No results found for requested labs within last 30 days. Urine culture  No results found for: LABURIN    Pathology:  Pathology results pending     Imaging:  I have personally reviewed the following films:    No results found. Scheduled Meds:   sodium chloride flush  5-40 mL IntraVENous 2 times per day    enoxaparin  40 mg SubCUTAneous Daily    tamsulosin  0.4 mg Oral Daily     Continuous Infusions:   sodium chloride       PRN Meds:.sodium chloride flush, sodium chloride, HYDROmorphone, ondansetron, HYDROcodone 5 mg - acetaminophen **OR** HYDROcodone 5 mg - acetaminophen      Assessment:  69 y.o. male admitted with   1. Preop testing    2.  Ileostomy in place [facility])        OR Date 10/10/2022, ileostomy reversal  Status-post robot-assist laparoscopic sigmoid colon resection with repair of colovesical fistula, ostomy creation, and appendectomy for colovesical fistula on 8/16/2022      Plan:  1. Pain well controlled, no nausea/vomiting--tolerating clear liquids, passing flatus, but mild distention on exam, vitals/labs stable; continued supportive care  2. Advance to regular diet as tolerated; monitor bowel function  3. Stop IV hydration  4. Activity as tolerated, ambulate TID, up to chair for all meals  5. Pulmonary toilet, incentive spirometry  6. PRN analgesics and antiemetics--minimizing narcotics as tolerated, transition to PO  7. DVT prophylaxis with  Lovenox and SCD's  8. Disposition: Aiming for discharge home tomorrow    EDUCATION:  Educated patient on plan of care and disease process--all questions answered. Plans discussed with patient and nursing. Reviewed and discussed with Dr. Wendy Arboleda.       Signed:  CLEMENTINA Hughes CNP  10/11/2022 12:41 PM    Doing well  Advance to general diet  Not ready for discharge home yet

## 2022-10-11 NOTE — OP NOTE
[facility]                     [facility], [address] [place] [address]                                OPERATIVE REPORT    PATIENT NAME: Betzaida Rock                    :        1955  MED REC NO:   [id number]                          ROOM:       4993  ACCOUNT NO:   [de-identified]                           ADMIT DATE: 10/10/2022  PROVIDER:     Leta Medellin MD    DATE OF PROCEDURE:  10/11/2022    PREOPERATIVE DIAGNOSIS:  Ileostomy in place. POSTOPERATIVE DIAGNOSIS:  Ileostomy in place. OPERATION PERFORMED:  Reversal of loop ileostomy. SURGEON:  Leta Medellin MD    ANESTHESIA:  General endotracheal and local.    ESTIMATED BLOOD LOSS:  Minimal.    COMPLICATIONS:  None. SPECIMENS:  Ostial segment of small bowel. OPERATIVE INDICATIONS AND CONSENT:  The patient is a 64-year-old male  who is status post repair of colovesical fistula with sigmoid colon and  coloproctostomy on 2022. During this procedure, he did undergo  diverting loop ileostomy. Postoperatively, he did undergo a Hypaque  enema, which showed that the anastomosis had healed without a leak. He  was brought to the operating day for reversal of the loop ileostomy. He  was explained the risks, benefits, and possible complications. DETAILS OF THE PROCEDURE:  The patient was brought to the operating  suite and placed in the supine position on the operating table. After  general endotracheal anesthesia, he was prepped and draped in the usual  sterile fashion. Preoperatively, the ileostomy had been sutured closed with 3-0 Vicryl  sutures. We made a transversely oriented elliptical skin incision. The  subcutaneous tissue was excised with cautery and then the ileostomy from  its attachments in the subcutaneous tissue and surrounding fascia. Once  this was done, it was mobile enough to deliver further small bowel  through the incision.   We divided the mesentery to the segment to be  removed using the LigaSure. We then placed 3-0 Vicryl sutures on the antimesenteric surface of the  two small bowel distal segment. Openings were made on the  antimesenteric surface of the bowel _____ sutures and then a MARIA M 75  stapler was fed into both openings and fired. This created a  side-to-side anastomosis. The anastomosis was completed and the  specimen was removed with a second transverse firing of a MARIA M 75 green  stapler load. The mesenteric defect was closed with interrupted 3-0  Vicryl sutures. A 3-0 Vicryl sutures was placed at the apex of  anastomosis. In order to reduce the anastomosis, we did have to enlarge the fascial  incisions slightly in both directions. The posterior fascia of the peritoneum was closed with a running 2-0  Vicryl suture. The external abdominal oblique was closed with  interrupted 0-Prolene sutures. The fascia was injected with a  combination of Marcaine and Exparel. Subcutaneous tissue was painted  with Betadine before it was loosely closed with 3-0 nylon vertical  mattress sutures. A dressing was applied. The patient tolerated she is  without difficulty and was transferred to the recovery room stable  condition. Merrill Smith.  Bridgette Schwab, MD    D: 10/11/2022 14:39:15       T: 10/11/2022 14:43:20     [email or web address]  Job#: [id number]     Doc#: [id number]    CC:

## 2022-10-11 NOTE — PROGRESS NOTES
Left message for mom informing her KK is out of the office on 11/23. We need to reschedule patient and sibling to another day.    Patient is awake in bed. Shift assessment completed. Neuro WNL. Reports pain 9/10 to abd at this time. AM meds administered per MAR. The care plan and education has been reviewed and mutually agreed upon with the patient. Standard safety measures in place. RN encouraged pt to ambulates in hallway.

## 2022-10-12 VITALS
WEIGHT: 215.8 LBS | TEMPERATURE: 97.6 F | BODY MASS INDEX: 29.23 KG/M2 | DIASTOLIC BLOOD PRESSURE: 64 MMHG | HEIGHT: 72 IN | OXYGEN SATURATION: 95 % | HEART RATE: 62 BPM | SYSTOLIC BLOOD PRESSURE: 106 MMHG | RESPIRATION RATE: 18 BRPM

## 2022-10-12 PROCEDURE — 6360000002 HC RX W HCPCS: Performed by: SURGERY

## 2022-10-12 PROCEDURE — 94760 N-INVAS EAR/PLS OXIMETRY 1: CPT

## 2022-10-12 PROCEDURE — 99024 POSTOP FOLLOW-UP VISIT: CPT | Performed by: NURSE PRACTITIONER

## 2022-10-12 PROCEDURE — 6370000000 HC RX 637 (ALT 250 FOR IP): Performed by: NURSE PRACTITIONER

## 2022-10-12 PROCEDURE — 6370000000 HC RX 637 (ALT 250 FOR IP): Performed by: SURGERY

## 2022-10-12 RX ADMIN — ENOXAPARIN SODIUM 40 MG: 100 INJECTION SUBCUTANEOUS at 07:56

## 2022-10-12 RX ADMIN — TAMSULOSIN HYDROCHLORIDE 0.4 MG: 0.4 CAPSULE ORAL at 07:56

## 2022-10-12 RX ADMIN — HYDROCODONE BITARTRATE AND ACETAMINOPHEN 2 TABLET: 5; 325 TABLET ORAL at 14:11

## 2022-10-12 RX ADMIN — HYDROCODONE BITARTRATE AND ACETAMINOPHEN 1 TABLET: 5; 325 TABLET ORAL at 07:56

## 2022-10-12 RX ADMIN — HYDROCODONE BITARTRATE AND ACETAMINOPHEN 2 TABLET: 5; 325 TABLET ORAL at 00:25

## 2022-10-12 ASSESSMENT — PAIN SCALES - GENERAL
PAINLEVEL_OUTOF10: 8
PAINLEVEL_OUTOF10: 8

## 2022-10-12 NOTE — DISCHARGE SUMMARY
present  Neurological - motor and sensory grossly normal bilaterally  Musculoskeletal - full range of motion without pain  Extremities - peripheral pulses normal, no pedal edema, no clubbing or cyanosis  Incision: healing well, no drainage, scant bleeding from skin edges, no erythema, well approximated with widely spaced sutures--dressing changed    LABS:  Recent Labs     10/10/22  0853 10/11/22  0552   WBC 6.7 10.3   HGB 14.2 12.2*   HCT 42.0 36.0*    299     --    K 4.1  --      --    CO2 25  --    BUN 13  --    CREATININE 0.7*  --        DISCHARGE INSTRUCTIONS:  1. Discharge medications:      Medication List        START taking these medications      HYDROcodone-acetaminophen 5-325 MG per tablet  Commonly known as: Norco  Take 1 tablet by mouth every 6 hours as needed for Pain for up to 7 days. Take lowest dose possible to manage pain; may stop taking sooner than 7 days if pain is able to be controlled with non-narcotic analgesics and therapies. CONTINUE taking these medications      FLOMAX PO               Where to Get Your Medications        You can get these medications from any pharmacy    Bring a paper prescription for each of these medications  HYDROcodone-acetaminophen 5-325 MG per tablet       2. Diet as tolerated. 3. Activity: activity as tolerated, no driving while on analgesics, and no heavy lifting for 6 weeks. 4. Wound care: keep incisions clean and dry  5. Follow-up: recommend follow up with PCP in 2-4 weeks. 6. Okay to shower, don't submerge incisions under water. 7. No driving until off pain medication and able to move torso freely without any pain. 8. Return to hospital, or contact Dr. Yulissa Ferrer office ([phone]) if any signs of infection are seen. 9. The patient is not currently smoking. Recommend maintaining a smoke-free lifestyle. 10. Return to Clinic: in 2 weeks.   11. Reviewed discharge instructions, restrictions, and reasons to call the office. EDUCATION:  Educated patient on plan of care and disease process--all questions answered. Plans discussed with patient and nursing. Reviewed and discussed with Dr. Misha West. Time spent for discharge: 30 minutes, including plan of care, patient education, and care coordination.       Signed:  CLEMENTINA Contreras CNP  10/12/2022 12:32 PM

## 2022-10-12 NOTE — PROGRESS NOTES
CLINICAL PHARMACY NOTE: MEDS TO BEDS    Total # of Prescriptions Filled: 1   The following medications were delivered to the patient:  [address]    Additional Documentation:  Richie Duncan to deliver per Kelly Ramsay, patient signed

## 2022-10-12 NOTE — DISCHARGE INSTRUCTIONS
[facility]    Discharge Instructions      Activity  - activity as tolerated, no driving while on analgesics, and no heavy lifting for 6 weeks; it is OK to be up walking around--walking up and down stairs is also OK. Do what is comfortable: stop and rest if you feel tired. Diet  - regular diet  - Drink plenty of fluids     Pain  - You may use narcotic pain medication as prescribed for breakthrough pain  - You can use OTC Tylenol or Ibuprofen for 1-2 weeks (may need to adjust the dose as directed on the bottle if enteric coated ibuprofen chosen)  - Avoid taking narcotic pain medication on an empty stomach which may result in nausea, if pain medication is causing nausea try decreasing the dose  - Narcotic pain medication is not necessary, please contact the office if pain is not controlled  - Narcotic pain medication will not be refilled on weekends and after hours  - Please contact the office Monday-Friday 8a-4p if a refill is requested    Nausea  - Avoid taking narcotic pain medication on an empty stomach which may result in nausea  - If pain medication is causing nausea you may try decreasing the dose  - Nausea can be common for 24 hours after surgery--if nausea uncontrolled or worsening, contact the office or go to the ED    Constipation  - Pain medication can be constipating  - Often, it helps to take a stool softener with the goal of at least one soft bowel movement daily with minimal straining  - You may also need to increase water and fiber intake--may supplement with Benefiber and increasing your activity will also be helpful  - If a stool softener is needed, the following OTC medications are recommend: Colace 100 mg by mouth twice daily, Miralax 17 gm by mouth 1-3 times daily with glass of water, dulcolax suppositories, and Fleets enemas    Wound Care  - keep wound clean and dry  - If incisional bleeding is noted, hold firm pressure for 15-20 minutes.  If remains uncontrolled, either contact the office or present to nearest ED  - It is common to have bruising or mild redness around the wounds within the first few days after surgery. If it worsens, or starts draining, do not hesitate to contact the office  - May shower but no tub baths or swimming pools--do not submerge incisions under water    Cautions  - Watch for signs of infection, such as: fever over 100.5, excessive warmth or redness around incisions, bloody or cloudy drainage from incisions  - Watch for signs of complication: uncontrolled pain, nausea, bloating, sudden lightheadedness  - Serious problems can arise after surgery that need urgent or immediate care  - Do not hesitate to contact the office with any questions    Follow-up with your surgeon in  2 weeks. Call [phone] to schedule follow-up visit.

## 2022-10-12 NOTE — PLAN OF CARE
Problem: Discharge Planning  Goal: Discharge to home or other facility with appropriate resources  Outcome: Progressing     Problem: Pain  Goal: Verbalizes/displays adequate comfort level or baseline comfort level  Outcome: Progressing     Problem: Skin/Tissue Integrity  Goal: Absence of new skin breakdown  Description: 1. Monitor for areas of redness and/or skin breakdown  2. Assess vascular access sites hourly  3. Every 4-6 hours minimum:  Change oxygen saturation probe site  4. Every 4-6 hours:  If on nasal continuous positive airway pressure, respiratory therapy assess nares and determine need for appliance change or resting period.   Outcome: Progressing     Problem: Safety - Adult  Goal: Free from fall injury  Outcome: Progressing     Problem: ABCDS Injury Assessment  Goal: Absence of physical injury  Outcome: Progressing     Problem: Gastrointestinal - Adult  Goal: Minimal or absence of nausea and vomiting  Outcome: Progressing  Goal: Maintains or returns to baseline bowel function  Outcome: Progressing  Goal: Maintains adequate nutritional intake  Outcome: Progressing

## 2022-10-12 NOTE — PLAN OF CARE
Problem: Discharge Planning  Goal: Discharge to home or other facility with appropriate resources  Outcome: Progressing  Flowsheets (Taken 10/11/2022 0853 by Pamela Ly RN)  Discharge to home or other facility with appropriate resources:   Identify barriers to discharge with patient and caregiver   Refer to discharge planning if patient needs post-hospital services based on physician order or complex needs related to functional status, cognitive ability or social support system     Problem: Pain  Goal: Verbalizes/displays adequate comfort level or baseline comfort level  Outcome: Progressing  Flowsheets (Taken 10/11/2022 0853 by Pamela Ly RN)  Verbalizes/displays adequate comfort level or baseline comfort level:   Encourage patient to monitor pain and request assistance   Assess pain using appropriate pain scale   Administer analgesics based on type and severity of pain and evaluate response     Problem: Skin/Tissue Integrity  Goal: Absence of new skin breakdown  Description: 1. Monitor for areas of redness and/or skin breakdown  2. Assess vascular access sites hourly  3. Every 4-6 hours minimum:  Change oxygen saturation probe site  4. Every 4-6 hours:  If on nasal continuous positive airway pressure, respiratory therapy assess nares and determine need for appliance change or resting period.   Outcome: Progressing     Problem: Safety - Adult  Goal: Free from fall injury  Outcome: Progressing     Problem: ABCDS Injury Assessment  Goal: Absence of physical injury  Outcome: Progressing     Problem: Gastrointestinal - Adult  Goal: Maintains adequate nutritional intake  10/11/2022 2206 by Guilherme Chavira RN  Outcome: Progressing

## 2022-10-12 NOTE — PLAN OF CARE
Problem: Discharge Planning  Goal: Discharge to home or other facility with appropriate resources  10/12/2022 1717 by Codi Hua RN  Outcome: Completed  10/12/2022 1706 by Codi Hua RN  Outcome: Progressing     Problem: Pain  Goal: Verbalizes/displays adequate comfort level or baseline comfort level  10/12/2022 1717 by Codi Hua RN  Outcome: Completed  10/12/2022 1706 by Codi Hua RN  Outcome: Progressing     Problem: Skin/Tissue Integrity  Goal: Absence of new skin breakdown  Description: 1. Monitor for areas of redness and/or skin breakdown  2. Assess vascular access sites hourly  3. Every 4-6 hours minimum:  Change oxygen saturation probe site  4. Every 4-6 hours:  If on nasal continuous positive airway pressure, respiratory therapy assess nares and determine need for appliance change or resting period.   10/12/2022 1717 by Codi Hua RN  Outcome: Completed  10/12/2022 1706 by Codi Hua RN  Outcome: Progressing     Problem: Safety - Adult  Goal: Free from fall injury  10/12/2022 1717 by Codi Hua RN  Outcome: Completed  10/12/2022 1706 by Codi Hua RN  Outcome: Progressing     Problem: ABCDS Injury Assessment  Goal: Absence of physical injury  10/12/2022 1717 by Codi Hua RN  Outcome: Completed  10/12/2022 1706 by Codi Hua RN  Outcome: Progressing     Problem: Gastrointestinal - Adult  Goal: Minimal or absence of nausea and vomiting  10/12/2022 1717 by Codi Hau RN  Outcome: Completed  10/12/2022 1706 by Codi Hua RN  Outcome: Progressing  Goal: Maintains or returns to baseline bowel function  10/12/2022 1717 by Codi Hua RN  Outcome: Completed  10/12/2022 1706 by Codi Hua RN  Outcome: Progressing  Goal: Maintains adequate nutritional intake  10/12/2022 1717 by Codi Hua RN  Outcome: Completed  10/12/2022 1706 by Codi Hua RN  Outcome: Progressing

## 2022-10-27 ENCOUNTER — OFFICE VISIT (OUTPATIENT)
Dept: SURGERY | Age: 67
End: 2022-10-27

## 2022-10-27 VITALS — WEIGHT: 201 LBS | BODY MASS INDEX: 27.26 KG/M2 | SYSTOLIC BLOOD PRESSURE: 102 MMHG | DIASTOLIC BLOOD PRESSURE: 70 MMHG

## 2022-10-27 DIAGNOSIS — N32.1 COLOVESICAL FISTULA: Primary | ICD-10-CM

## 2022-10-27 PROCEDURE — 99024 POSTOP FOLLOW-UP VISIT: CPT | Performed by: SURGERY

## 2022-10-27 NOTE — PROGRESS NOTES
Subjective:      Patient ID: Ash Leahy is a 69 y.o. male. HPI    Review of Systems    Objective:   Physical Exam  Abdomen soft  Incision well-healed  Assessment:      78-year-old male status post reversal of loop ileostomy on 10/10/2022. He is status post robotic laparoscopic repair of colovesical fistula with sigmoid colon resection, coloproctostomy and diverting loop ileostomy on 8/16/2022. He is recovering well from both procedures. Plan:      Continue lifting restrictions for 4 more weeks. Follow-up as needed.         Tabby Carvalho MD

## 2022-10-27 NOTE — LETTER
[facility]  [address] [address]  Phone: [phone]  Fax: [phone]    October 27, 2022    Patient: John Stubbs  MRN:  [id number]  YOB: 1955  Date of Visit: 10/27/2022    Dear Brittany Davis,    Thank you for the request for consultation for Arti Beltran. Below are the relevant portions of my assessment and plan of care. Assessment:  18-year-old male status post reversal of loop ileostomy on 10/10/2022. He is status post robotic laparoscopic repair of colovesical fistula with sigmoid colon resection, coloproctostomy and diverting loop ileostomy on 8/16/2022. He is recovering well from both procedures. Plan:  Continue lifting restrictions for 4 more weeks. Follow-up as needed. If you have questions, please do not hesitate to call me. I look forward to following Gabrielle Lang along with you.     Sincerely,    Uyen Thomas MD    CC providers:    Ziggy Cohn DO  [address] [place], [place] [address]  Via Fax: [phone]     Christy Santiago APRN - CNP  [facility] [id number]  Via In [facility]

## 2022-11-09 PROBLEM — Z01.818 PREOP TESTING: Status: RESOLVED | Noted: 2022-10-10 | Resolved: 2022-11-09

## (undated) DEVICE — SUTURE VCRL + SZ 0 L27IN CT 3 ABSRB VCP329H

## (undated) DEVICE — SUTURE PERMAHAND SZ 3-0 L18IN NONABSORBABLE BLK L26MM SH C013D

## (undated) DEVICE — GAUZE,SPONGE,4"X4",8PLY,STRL,LF,10/TRAY: Brand: MEDLINE

## (undated) DEVICE — STAPLER INT L75MM H1.5X1.8X2MM STD TI 6 ROW LIN CUT

## (undated) DEVICE — CYSTO/BLADDER IRRIGATION SET, REGULATING CLAMP

## (undated) DEVICE — STAPLER 60 RELOAD GREEN: Brand: SUREFORM

## (undated) DEVICE — SUTURE PDS II SZ 0 L60IN ABSRB VLT L48MM CTX 1/2 CIR Z990G

## (undated) DEVICE — SUTURE MCRYL + SZ 4-0 L27IN ABSRB UD L19MM PS-2 3/8 CIR MCP426H

## (undated) DEVICE — SOLUTION IRRIG 1000ML 0.9% SOD CHL USP POUR PLAS BTL

## (undated) DEVICE — SYRINGE MED 10ML TRNSLUC BRL PLUNG BLK MRK POLYPR CTRL

## (undated) DEVICE — PROTECTOR EYE PT SELF ADH NS OPT GRD LF

## (undated) DEVICE — Device: Brand: SENSURA MIO CONVEX BRAND LOGO

## (undated) DEVICE — SEAL

## (undated) DEVICE — SUTURE VCRL SZ 0 L36IN ABSRB UD CT-1 L36MM 1/2 CIR TAPR PNT VCP946H

## (undated) DEVICE — MAJOR SET UP PK

## (undated) DEVICE — PENCIL ES L3M BTTN SWCH S STL HEX LOK BLDE ELECTRD HOLSTER

## (undated) DEVICE — SUTURE VCRL + SZ 3-0 L18IN ABSRB UD SH 1/2 CIR TAPERCUT NDL VCP864D

## (undated) DEVICE — TROCAR: Brand: KII FIOS FIRST ENTRY

## (undated) DEVICE — BLADE CLIPPER GEN PURP NS

## (undated) DEVICE — REDUCER: Brand: ENDOWRIST

## (undated) DEVICE — SUCTION IRRIGATOR: Brand: ENDOWRIST

## (undated) DEVICE — HYPODERMIC SAFETY NEEDLE: Brand: MAGELLAN

## (undated) DEVICE — SUTURE VCRL + SZ 2-0 L18IN ABSRB CLR TIE POLYGLACTIN 910 VCP111G

## (undated) DEVICE — SHEET,DRAPE,53X77,STERILE: Brand: MEDLINE

## (undated) DEVICE — SPONGE LAP W18XL18IN WHT COT 4 PLY FLD STRUNG RADPQ DISP ST

## (undated) DEVICE — PMI DISPOSABLE PUNCTURE CLOSURE DEVICE / SUTURE GRASPER: Brand: PMI

## (undated) DEVICE — Device

## (undated) DEVICE — LIGHT HANDLE: Brand: DEVON

## (undated) DEVICE — LOOP OSTOMY BRIDGE - STERILE: Brand: HOLLISTER

## (undated) DEVICE — STAPLER EXT 65MM S STL AUTO DISP PURSTRING

## (undated) DEVICE — ADHESIVE SKIN CLSR 0.7ML TOP DERMBND ADV

## (undated) DEVICE — STAPLER INT H4.4X1.5MM DIA75MM 0DEG TI UNIV 6 ROW CART

## (undated) DEVICE — SUTURE PERMA-HAND SZ 2-0 L30IN NONABSORBABLE BLK L26MM SH K833H

## (undated) DEVICE — CADIERE FORCEPS: Brand: ENDOWRIST

## (undated) DEVICE — BLADELESS OBTURATOR: Brand: WECK VISTA

## (undated) DEVICE — SHEET,DRAPE,40X58,STERILE: Brand: MEDLINE

## (undated) DEVICE — SUTURE VCRL + SZ 3-0 L27IN ABSRB UD L26MM SH 1/2 CIR VCP416H

## (undated) DEVICE — BLANKET WRM W29.9XL79.1IN UP BODY FORC AIR MISTRAL-AIR

## (undated) DEVICE — Device: Brand: SENSURA MIO

## (undated) DEVICE — ELECTRODE PT RET AD L9FT HI MOIST COND ADH HYDRGEL CORDED

## (undated) DEVICE — TRI-LUMEN FILTERED TUBE SET WITH ACTIVATED CHARCOAL FILTER: Brand: AIRSEAL

## (undated) DEVICE — SUTURE VLOC 90 2/0 VL 6 GS-22 VLOCM2105

## (undated) DEVICE — STERILE POLYISOPRENE POWDER-FREE SURGICAL GLOVES: Brand: PROTEXIS

## (undated) DEVICE — ROBOTIC PK

## (undated) DEVICE — MERCY FAIRFIELD TURNOVER KIT: Brand: MEDLINE INDUSTRIES, INC.

## (undated) DEVICE — TOWEL,OR,DSP,ST,BLUE,STD,4/PK,20PK/CS: Brand: MEDLINE

## (undated) DEVICE — SYRINGE IRRIG 60ML SFT PLIABLE BLB EZ TO GRP 1 HND USE W/

## (undated) DEVICE — STAPLER INT DIA29MM CLS STPL H1.5-2.2MM OPN LEG L5.2MM 26

## (undated) DEVICE — SHEET, T, LAPAROTOMY, STERILE: Brand: MEDLINE

## (undated) DEVICE — INVIEW CLEAR LEGGINGS: Brand: CONVERTORS

## (undated) DEVICE — CATHETER,URETHRAL,REDRUBBER,STRL,16FR: Brand: MEDLINE

## (undated) DEVICE — TOTAL TRAY, DB, 100% SILI FOLEY, 16FR 10: Brand: MEDLINE

## (undated) DEVICE — APPLICATOR MEDICATED 26 CC SOLUTION HI LT ORNG CHLORAPREP

## (undated) DEVICE — PENCIL SMK EVAC TELSCP 3 M TBNG

## (undated) DEVICE — PENCIL SMK EVAC 10 FT BLADE ELECTRD ROCKER FOR TELSCP

## (undated) DEVICE — VESSEL SEALER EXTEND: Brand: ENDOWRIST

## (undated) DEVICE — SUTURE PROL SZ 0 L18IN NONABSORBABLE BLU MO-6 L26MM 1/2 CIR C845G

## (undated) DEVICE — TIP-UP FENESTRATED GRASPER: Brand: ENDOWRIST

## (undated) DEVICE — WET SKIN PREP TRAY: Brand: MEDLINE INDUSTRIES, INC.

## (undated) DEVICE — STAPLER 60: Brand: SUREFORM

## (undated) DEVICE — NEEDLE INSUF L120MM DIA2MM DISP FOR PNEUMOPERI ENDOPATH

## (undated) DEVICE — SEALER ENDOSCP NANO COAT OPN DIV CRV L JAW LIGASURE IMPACT

## (undated) DEVICE — ARM DRAPE

## (undated) DEVICE — 30977 SEE SHARP - ENHANCED INTRAOPERATIVE LAPAROSCOPE CLEANING & DEFOGGING: Brand: 30977 SEE SHARP - ENHANCED INTRAOPERATIVE LAPAROSCOPE CLEANING & DEFOGGING

## (undated) DEVICE — GAUZE,SPONGE,4"X4",16PLY,XRAY,STRL,LF: Brand: MEDLINE

## (undated) DEVICE — CANNULA SEAL

## (undated) DEVICE — STAPLER SHEATH: Brand: ENDOWRIST

## (undated) DEVICE — PAD ABSRB W8XL10IN ABD HYDROPHOBIC NONWOVEN THCK LAYR CELOS

## (undated) DEVICE — SUTURE PDS + SZ 1 L96IN ABSRB VLT L65MM TP-1 1/2 CIR PDP880G

## (undated) DEVICE — TIP COVER ACCESSORY

## (undated) DEVICE — AIRSEAL 5 MM ACCESS PORT AND LOW PROFILE OBTURATOR WITH BLADELESS OPTICAL TIP, 100 MM LENGTH: Brand: AIRSEAL